# Patient Record
Sex: FEMALE | Race: WHITE | NOT HISPANIC OR LATINO | Employment: FULL TIME | URBAN - METROPOLITAN AREA
[De-identification: names, ages, dates, MRNs, and addresses within clinical notes are randomized per-mention and may not be internally consistent; named-entity substitution may affect disease eponyms.]

---

## 2024-02-11 ENCOUNTER — HOSPITAL ENCOUNTER (INPATIENT)
Facility: MEDICAL CENTER | Age: 29
LOS: 3 days | DRG: 418 | End: 2024-02-14
Attending: HOSPITALIST | Admitting: HOSPITALIST
Payer: COMMERCIAL

## 2024-02-11 DIAGNOSIS — K81.0 ACUTE CHOLECYSTITIS: ICD-10-CM

## 2024-02-11 DIAGNOSIS — Z90.49 S/P LAPAROSCOPIC CHOLECYSTECTOMY: ICD-10-CM
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
## 2024-02-11 PROBLEM — D56.9 THALASSEMIA: Status: ACTIVE | Noted: 2024-02-11

## 2024-02-11 PROBLEM — E80.6 HYPERBILIRUBINEMIA: Status: ACTIVE | Noted: 2024-02-11

## 2024-02-11 PROBLEM — K83.1 OBSTRUCTIVE JAUNDICE: Status: ACTIVE | Noted: 2024-02-11

## 2024-02-11 PROBLEM — E87.6 HYPOKALEMIA: Status: ACTIVE | Noted: 2024-02-11

## 2024-02-11 PROBLEM — K80.50 CHOLEDOCHOLITHIASIS: Status: ACTIVE | Noted: 2024-02-11

## 2024-02-11 PROBLEM — K83.1 OBSTRUCTIVE JAUNDICE: Status: RESOLVED | Noted: 2024-02-11 | Resolved: 2024-02-11

## 2024-02-11 PROCEDURE — 700105 HCHG RX REV CODE 258: Performed by: HOSPITALIST

## 2024-02-11 PROCEDURE — 770001 HCHG ROOM/CARE - MED/SURG/GYN PRIV*

## 2024-02-11 PROCEDURE — 94760 N-INVAS EAR/PLS OXIMETRY 1: CPT

## 2024-02-11 PROCEDURE — 99223 1ST HOSP IP/OBS HIGH 75: CPT | Performed by: HOSPITALIST

## 2024-02-11 PROCEDURE — 700111 HCHG RX REV CODE 636 W/ 250 OVERRIDE (IP): Mod: JZ | Performed by: HOSPITALIST

## 2024-02-11 RX ORDER — PROMETHAZINE HYDROCHLORIDE 25 MG/1
12.5-25 SUPPOSITORY RECTAL EVERY 4 HOURS PRN
Status: DISCONTINUED | OUTPATIENT
Start: 2024-02-11 | End: 2024-02-14 | Stop reason: HOSPADM

## 2024-02-11 RX ORDER — HYDROMORPHONE HYDROCHLORIDE 1 MG/ML
0.5 INJECTION, SOLUTION INTRAMUSCULAR; INTRAVENOUS; SUBCUTANEOUS
Status: DISCONTINUED | OUTPATIENT
Start: 2024-02-11 | End: 2024-02-14

## 2024-02-11 RX ORDER — POLYETHYLENE GLYCOL 3350 17 G/17G
1 POWDER, FOR SOLUTION ORAL
Status: DISCONTINUED | OUTPATIENT
Start: 2024-02-11 | End: 2024-02-14 | Stop reason: HOSPADM

## 2024-02-11 RX ORDER — ONDANSETRON 2 MG/ML
4 INJECTION INTRAMUSCULAR; INTRAVENOUS EVERY 4 HOURS PRN
Status: DISCONTINUED | OUTPATIENT
Start: 2024-02-11 | End: 2024-02-14 | Stop reason: HOSPADM

## 2024-02-11 RX ORDER — PROCHLORPERAZINE EDISYLATE 5 MG/ML
5-10 INJECTION INTRAMUSCULAR; INTRAVENOUS EVERY 4 HOURS PRN
Status: DISCONTINUED | OUTPATIENT
Start: 2024-02-11 | End: 2024-02-14 | Stop reason: HOSPADM

## 2024-02-11 RX ORDER — PROMETHAZINE HYDROCHLORIDE 25 MG/1
12.5-25 TABLET ORAL EVERY 4 HOURS PRN
Status: DISCONTINUED | OUTPATIENT
Start: 2024-02-11 | End: 2024-02-14 | Stop reason: HOSPADM

## 2024-02-11 RX ORDER — ONDANSETRON 4 MG/1
4 TABLET, ORALLY DISINTEGRATING ORAL EVERY 4 HOURS PRN
Status: DISCONTINUED | OUTPATIENT
Start: 2024-02-11 | End: 2024-02-14 | Stop reason: HOSPADM

## 2024-02-11 RX ORDER — ACETAMINOPHEN 325 MG/1
650 TABLET ORAL EVERY 6 HOURS PRN
Status: DISCONTINUED | OUTPATIENT
Start: 2024-02-11 | End: 2024-02-14 | Stop reason: HOSPADM

## 2024-02-11 RX ORDER — AMOXICILLIN 250 MG
2 CAPSULE ORAL 2 TIMES DAILY
Status: DISCONTINUED | OUTPATIENT
Start: 2024-02-11 | End: 2024-02-14 | Stop reason: HOSPADM

## 2024-02-11 RX ADMIN — HYDROMORPHONE HYDROCHLORIDE 0.5 MG: 1 INJECTION, SOLUTION INTRAMUSCULAR; INTRAVENOUS; SUBCUTANEOUS at 22:07

## 2024-02-11 RX ADMIN — PIPERACILLIN SODIUM AND TAZOBACTAM SODIUM 3.38 G: 3; .375 INJECTION, POWDER, LYOPHILIZED, FOR SOLUTION INTRAVENOUS at 22:10

## 2024-02-11 ASSESSMENT — ENCOUNTER SYMPTOMS
BRUISES/BLEEDS EASILY: 0
BLURRED VISION: 0
INSOMNIA: 0
NAUSEA: 1
ABDOMINAL PAIN: 1
COUGH: 0
VOMITING: 0
DIZZINESS: 0
MYALGIAS: 0
SHORTNESS OF BREATH: 0
DOUBLE VISION: 0
DEPRESSION: 0
SORE THROAT: 0
NECK PAIN: 0
HEADACHES: 0
PALPITATIONS: 0
FALLS: 1
WEAKNESS: 0
FEVER: 0

## 2024-02-11 ASSESSMENT — LIFESTYLE VARIABLES
EVER FELT BAD OR GUILTY ABOUT YOUR DRINKING: NO
CONSUMPTION TOTAL: POSITIVE
ALCOHOL_USE: YES
TOTAL SCORE: 0
EVER HAD A DRINK FIRST THING IN THE MORNING TO STEADY YOUR NERVES TO GET RID OF A HANGOVER: NO
ON A TYPICAL DAY WHEN YOU DRINK ALCOHOL HOW MANY DRINKS DO YOU HAVE: 2
HOW MANY TIMES IN THE PAST YEAR HAVE YOU HAD 5 OR MORE DRINKS IN A DAY: 5
TOTAL SCORE: 0
HAVE PEOPLE ANNOYED YOU BY CRITICIZING YOUR DRINKING: NO
TOTAL SCORE: 0
HAVE YOU EVER FELT YOU SHOULD CUT DOWN ON YOUR DRINKING: NO
AVERAGE NUMBER OF DAYS PER WEEK YOU HAVE A DRINK CONTAINING ALCOHOL: 1

## 2024-02-11 ASSESSMENT — PAIN DESCRIPTION - PAIN TYPE
TYPE: ACUTE PAIN

## 2024-02-11 ASSESSMENT — PATIENT HEALTH QUESTIONNAIRE - PHQ9
2. FEELING DOWN, DEPRESSED, IRRITABLE, OR HOPELESS: NOT AT ALL
SUM OF ALL RESPONSES TO PHQ9 QUESTIONS 1 AND 2: 0
1. LITTLE INTEREST OR PLEASURE IN DOING THINGS: NOT AT ALL

## 2024-02-11 ASSESSMENT — FIBROSIS 4 INDEX: FIB4 SCORE: 1.28

## 2024-02-12 ENCOUNTER — APPOINTMENT (OUTPATIENT)
Dept: RADIOLOGY | Facility: MEDICAL CENTER | Age: 29
DRG: 418 | End: 2024-02-12
Attending: INTERNAL MEDICINE
Payer: COMMERCIAL

## 2024-02-12 ENCOUNTER — ANESTHESIA EVENT (OUTPATIENT)
Dept: SURGERY | Facility: MEDICAL CENTER | Age: 29
DRG: 418 | End: 2024-02-12
Payer: COMMERCIAL

## 2024-02-12 PROBLEM — K82.8 THICKENING OF WALL OF GALLBLADDER: Status: ACTIVE | Noted: 2024-02-12

## 2024-02-12 PROBLEM — D56.1 BETA-THALASSEMIA (HCC): Status: ACTIVE | Noted: 2024-02-11

## 2024-02-12 LAB
ALBUMIN SERPL BCP-MCNC: 4.3 G/DL (ref 3.2–4.9)
ALBUMIN/GLOB SERPL: 2.2 G/DL
ALP SERPL-CCNC: 86 U/L (ref 30–99)
ALT SERPL-CCNC: 419 U/L (ref 2–50)
ANION GAP SERPL CALC-SCNC: 14 MMOL/L (ref 7–16)
AST SERPL-CCNC: 367 U/L (ref 12–45)
BILIRUB SERPL-MCNC: 10.8 MG/DL (ref 0.1–1.5)
BUN SERPL-MCNC: 6 MG/DL (ref 8–22)
CALCIUM ALBUM COR SERPL-MCNC: 8.1 MG/DL (ref 8.5–10.5)
CALCIUM SERPL-MCNC: 8.3 MG/DL (ref 8.4–10.2)
CHLORIDE SERPL-SCNC: 105 MMOL/L (ref 96–112)
CO2 SERPL-SCNC: 19 MMOL/L (ref 20–33)
CREAT SERPL-MCNC: 0.37 MG/DL (ref 0.5–1.4)
ERYTHROCYTE [DISTWIDTH] IN BLOOD BY AUTOMATED COUNT: 42.6 FL (ref 35.9–50)
FERRITIN SERPL-MCNC: 1934 NG/ML (ref 10–291)
GFR SERPLBLD CREATININE-BSD FMLA CKD-EPI: 142 ML/MIN/1.73 M 2
GLOBULIN SER CALC-MCNC: 2 G/DL (ref 1.9–3.5)
GLUCOSE SERPL-MCNC: 106 MG/DL (ref 65–99)
HCT VFR BLD AUTO: 33 % (ref 37–47)
HGB BLD-MCNC: 9.8 G/DL (ref 12–16)
IRON SATN MFR SERPL: ABNORMAL % (ref 15–55)
IRON SERPL-MCNC: 147 UG/DL (ref 40–170)
LIPASE SERPL-CCNC: 18 U/L (ref 11–82)
MCH RBC QN AUTO: 20.7 PG (ref 27–33)
MCHC RBC AUTO-ENTMCNC: 29.7 G/DL (ref 32.2–35.5)
MCV RBC AUTO: 69.8 FL (ref 81.4–97.8)
PLATELET # BLD AUTO: 283 K/UL (ref 164–446)
POTASSIUM SERPL-SCNC: 4.2 MMOL/L (ref 3.6–5.5)
PROT SERPL-MCNC: 6.3 G/DL (ref 6–8.2)
RBC # BLD AUTO: 4.73 M/UL (ref 4.2–5.4)
SODIUM SERPL-SCNC: 138 MMOL/L (ref 135–145)
TIBC SERPL-MCNC: ABNORMAL UG/DL (ref 250–450)
UIBC SERPL-MCNC: <17 UG/DL (ref 110–370)
WBC # BLD AUTO: 10.1 K/UL (ref 4.8–10.8)

## 2024-02-12 PROCEDURE — 83550 IRON BINDING TEST: CPT

## 2024-02-12 PROCEDURE — 74181 MRI ABDOMEN W/O CONTRAST: CPT

## 2024-02-12 PROCEDURE — 36415 COLL VENOUS BLD VENIPUNCTURE: CPT

## 2024-02-12 PROCEDURE — 700111 HCHG RX REV CODE 636 W/ 250 OVERRIDE (IP): Mod: JZ | Performed by: HOSPITALIST

## 2024-02-12 PROCEDURE — 770001 HCHG ROOM/CARE - MED/SURG/GYN PRIV*

## 2024-02-12 PROCEDURE — 700105 HCHG RX REV CODE 258: Performed by: HOSPITALIST

## 2024-02-12 PROCEDURE — 85027 COMPLETE CBC AUTOMATED: CPT

## 2024-02-12 PROCEDURE — 99418 PROLNG IP/OBS E/M EA 15 MIN: CPT | Performed by: INTERNAL MEDICINE

## 2024-02-12 PROCEDURE — 80053 COMPREHEN METABOLIC PANEL: CPT

## 2024-02-12 PROCEDURE — 83690 ASSAY OF LIPASE: CPT

## 2024-02-12 PROCEDURE — 94760 N-INVAS EAR/PLS OXIMETRY 1: CPT

## 2024-02-12 PROCEDURE — 99233 SBSQ HOSP IP/OBS HIGH 50: CPT | Performed by: INTERNAL MEDICINE

## 2024-02-12 PROCEDURE — 82728 ASSAY OF FERRITIN: CPT

## 2024-02-12 PROCEDURE — 700111 HCHG RX REV CODE 636 W/ 250 OVERRIDE (IP): Mod: JZ | Performed by: INTERNAL MEDICINE

## 2024-02-12 PROCEDURE — 83540 ASSAY OF IRON: CPT

## 2024-02-12 PROCEDURE — 700105 HCHG RX REV CODE 258: Performed by: INTERNAL MEDICINE

## 2024-02-12 RX ORDER — IBUPROFEN 200 MG
400-600 TABLET ORAL EVERY 6 HOURS PRN
COMMUNITY

## 2024-02-12 RX ORDER — HYDROMORPHONE HYDROCHLORIDE 1 MG/ML
0.5 INJECTION, SOLUTION INTRAMUSCULAR; INTRAVENOUS; SUBCUTANEOUS ONCE
Status: COMPLETED | OUTPATIENT
Start: 2024-02-12 | End: 2024-02-12

## 2024-02-12 RX ORDER — SODIUM CHLORIDE, SODIUM LACTATE, POTASSIUM CHLORIDE, CALCIUM CHLORIDE 600; 310; 30; 20 MG/100ML; MG/100ML; MG/100ML; MG/100ML
INJECTION, SOLUTION INTRAVENOUS CONTINUOUS
Status: ACTIVE | OUTPATIENT
Start: 2024-02-12 | End: 2024-02-14

## 2024-02-12 RX ADMIN — HYDROMORPHONE HYDROCHLORIDE 0.5 MG: 1 INJECTION, SOLUTION INTRAMUSCULAR; INTRAVENOUS; SUBCUTANEOUS at 01:25

## 2024-02-12 RX ADMIN — PIPERACILLIN SODIUM AND TAZOBACTAM SODIUM 3.38 G: 3; .375 INJECTION, POWDER, LYOPHILIZED, FOR SOLUTION INTRAVENOUS at 01:26

## 2024-02-12 RX ADMIN — HYDROMORPHONE HYDROCHLORIDE 0.5 MG: 1 INJECTION, SOLUTION INTRAMUSCULAR; INTRAVENOUS; SUBCUTANEOUS at 03:24

## 2024-02-12 RX ADMIN — SODIUM CHLORIDE, POTASSIUM CHLORIDE, SODIUM LACTATE AND CALCIUM CHLORIDE: 600; 310; 30; 20 INJECTION, SOLUTION INTRAVENOUS at 14:04

## 2024-02-12 RX ADMIN — HYDROMORPHONE HYDROCHLORIDE 0.5 MG: 1 INJECTION, SOLUTION INTRAMUSCULAR; INTRAVENOUS; SUBCUTANEOUS at 05:28

## 2024-02-12 RX ADMIN — CEFTRIAXONE SODIUM 1000 MG: 1 INJECTION, POWDER, FOR SOLUTION INTRAMUSCULAR; INTRAVENOUS at 12:58

## 2024-02-12 ASSESSMENT — ENCOUNTER SYMPTOMS
VOMITING: 0
FEVER: 0
CONSTIPATION: 0
PALPITATIONS: 0
HEADACHES: 0
COUGH: 0
ABDOMINAL PAIN: 0
BACK PAIN: 0
SHORTNESS OF BREATH: 0
DIZZINESS: 0
NAUSEA: 0
CHILLS: 0
DIARRHEA: 0

## 2024-02-12 ASSESSMENT — COGNITIVE AND FUNCTIONAL STATUS - GENERAL
MOVING TO AND FROM BED TO CHAIR: A LITTLE
SUGGESTED CMS G CODE MODIFIER MOBILITY: CJ
SUGGESTED CMS G CODE MODIFIER DAILY ACTIVITY: CH
MOBILITY SCORE: 21
DAILY ACTIVITIY SCORE: 24
TURNING FROM BACK TO SIDE WHILE IN FLAT BAD: A LITTLE
MOVING FROM LYING ON BACK TO SITTING ON SIDE OF FLAT BED: A LITTLE

## 2024-02-12 ASSESSMENT — PAIN DESCRIPTION - PAIN TYPE
TYPE: ACUTE PAIN

## 2024-02-12 NOTE — PROGRESS NOTES
Hospital Medicine Daily Progress Note    Date of Service  2/12/2024    Chief Complaint  Sandra Krishnan is a 28 y.o. female admitted 2/11/2024 with   Transfer from [facility]    Hospital Course  No notes on file    Interval Problem Update  Patient stated she does not want any laparoscopic cholecystectomy if there is no signs of infection.  -Pending MRCP  - General surgery aware, Dr. Phelan evaluated patient, awaiting imaging.  -I reviewed labs , , ALP 86, T. bili 10.8     I spent extra time on patient's case since 16:00.  - I discussed case with Dr. Phelan, we explored options including laparoscopic cholecystectomy, HIDA scan, monitoring.  T. bili is constantly elevated at 10.8 from a patient says.  HIDA scan here at [facility] will not be able to be performed with a high bilirubin.  We have changed recommendations to conservatively manage patient, allow patient to eat tonight, stop antibiotics and see her labs in the morning.  If there is any signs of improvement in her LFTs and no leukocytosis tomorrow, she may have passed the stone without the need for ERCP and she may not need cholecystectomy.  At that point we can abide by patient's wishes to avoid any surgery.    I have discussed this patient's plan of care and discharge plan at IDT rounds today with Case Management, Nursing, Nursing leadership, and other members of the IDT team.    Consultants/Specialty  general surgery    Code Status  Full Code    Disposition  The patient is not medically cleared for discharge to home or a post-acute facility.      I have placed the appropriate orders for post-discharge needs.    Review of Systems  Review of Systems   Constitutional:  Negative for chills, fever and malaise/fatigue.   Respiratory:  Negative for cough and shortness of breath.    Cardiovascular:  Negative for chest pain and palpitations.   Gastrointestinal:  Negative for abdominal pain, constipation, diarrhea, nausea and  vomiting.   Musculoskeletal:  Negative for back pain and joint pain.   Skin:         Jaundice+   Neurological:  Negative for dizziness and headaches.   All other systems reviewed and are negative.       Physical Exam  Temp:  [36.3 °C (97.4 °F)-37.1 °C (98.7 °F)] 36.9 °C (98.5 °F)  Pulse:  [60-75] 73  Resp:  [16-18] 18  BP: (102-125)/(54-73) 111/61  SpO2:  [93 %-98 %] 94 %    Physical Exam  Vitals and nursing note reviewed.   Constitutional:       General: She is not in acute distress.     Appearance: She is not ill-appearing.   HENT:      Head: Normocephalic and atraumatic.   Eyes:      General: No scleral icterus.     Extraocular Movements: Extraocular movements intact.   Cardiovascular:      Rate and Rhythm: Normal rate.      Pulses: Normal pulses.      Heart sounds: Normal heart sounds. No murmur heard.  Pulmonary:      Effort: Pulmonary effort is normal. No respiratory distress.      Breath sounds: Normal breath sounds.   Abdominal:      General: Abdomen is flat. Bowel sounds are normal. There is no distension.      Palpations: Abdomen is soft.   Musculoskeletal:         General: No swelling or tenderness. Normal range of motion.      Cervical back: Normal range of motion and neck supple.   Skin:     General: Skin is warm.      Capillary Refill: Capillary refill takes less than 2 seconds.      Coloration: Skin is jaundiced.      Findings: No erythema.   Neurological:      General: No focal deficit present.      Mental Status: She is alert and oriented to person, place, and time. Mental status is at baseline.      Motor: No weakness.   Psychiatric:         Mood and Affect: Mood normal.         Behavior: Behavior normal.         Thought Content: Thought content normal.         Judgment: Judgment normal.         Fluids    Intake/Output Summary (Last 24 hours) at 2/12/2024 1733  Last data filed at 2/12/2024 1727  Gross per 24 hour   Intake 240 ml   Output 700 ml   Net -460 ml       Laboratory  Recent Labs      02/11/24  1348 02/12/24  0235   WBC 16.8* 10.1   RBC 5.24 4.73   HEMOGLOBIN 10.8* 9.8*   HEMATOCRIT 36.3* 33.0*   MCV 69.3* 69.8*   MCH 20.6* 20.7*   MCHC 29.8* 29.7*   RDW 19.2* 42.6   PLATELETCT 271 283   MPV -  --      Recent Labs     02/11/24  1348 02/12/24  0235   SODIUM 139 138   POTASSIUM 3.4* 4.2   CHLORIDE 109* 105   CO2 19* 19*   GLUCOSE 101* 106*   BUN 9 6*   CREATININE 0.7 0.37*   CALCIUM 9.0 8.3*                   Imaging  [id number]   Final Result      1.  No significant biliary dilation.   2.  No common bile duct stone demonstrated.  Apparent interval passage of previously described stones.   3.  Gallbladder wall thickening suggesting cholecystitis, without gallstone present.   4.  Liver shows periportal edema which may be related to intra-abdominal inflammatory process or aggressive hydration.   5.  Mild splenomegaly.           Assessment/Plan  * Choledocholithiasis- (present on admission)  Assessment & Plan  -Inpatient status on medical floor.  -N.p.o. for now.  -Patient presenting with abdominal pain that is started 30 to 40 minutes after snowboarding fall with CT negative for acute trauma at Outside facility.  -CT showing cholelithiasis and biliary duct dilation and unclear if trauma possibly dislodged a stone. Prior h/o ERCP 4 year ago, and still has Gallbladder. Her total bilirubin is 6.9 but patient has baseline hyperbilirubinemia given underlying history of thalassemia.  -Noticed Splenomegaly on CT.     -Pending MRCP  - I discussed with general surgeon Dr. Phelan, patient does not want cholecystectomy if there is no signs of infection.  - May need GI first   -I reviewed labs , , ALP 86, T. bili 10.8     Thickening of wall of gallbladder- (present on admission)  Assessment & Plan  Seen on MRCP  Unable to do HIDA scan due to chronic hyperbilirubinemia    I spent extra time at patient's bedside total 15 minutes  -Prior to me arriving, I was discussing case with Dr. Phelan,  informing about MRCP results and potential treatment plan including laparoscopic cholecystectomy.  I increased patient's ceftriaxone dose to cover for potential acute cholecystitis seen on MRCP.  There was no choledocholithiasis seen on scan.  -I discussed with patient about her MRCP results.  Patient describing she was sent here only for ERCP and demanding to know why she is now needing surgery.  I explained to her that the MRCP does not show choledocholithiasis which does not warrant an ERCP.  I explained that the surgeon will speak with her today but will need to see her in between his operating times.  She was frustrated at the delay in care.  I explained that the surgeon would be the best person to hopefully explain any issues with the laparoscopic cholecystectomy and her thalassemia condition.   -She spoke with Dr. Phelan, she decided not to do surgery.  Shortly after speaking with Dr. Phelan she changed her mind and told bedside RN Liz she wanted to proceed with surgery.  At this time I was already speaking with Dr. Phelan who recommended HIDA scan given the patient's concerns and uncertainty of her decision.  - I went back to patient's room and explained that we need to do a HIDA scan as per surgery's recommendation.  Patient and her fiancé question why we need to do a HIDA scan now.  I explained that this will help us further investigate if she has cholecystitis but she will need to eat diet tonight and perform the scan tomorrow.  I explained that this will delay her potential discharge, she was requesting to discharge in the next day or so.  -I read discussed with Dr. Phelan, T. bili is constantly elevated at 10.8 from a patient says.  HIDA scan here at [facility] will not be able to be performed with a high bilirubin.  We have changed recommendations to conservatively manage patient, allow patient to eat tonight, stop antibiotics and see her labs in the morning.  If there is any signs of improvement,  she may have passed the stone without the need for ERCP.  At that point we can abide by patient's wishes to avoid any surgery.    Fall- (present on admission)  Assessment & Plan  -Fell forward after jumping with snowboard. Developed RUQ pain 30-40 min after. No signs of trauma in her abdomen and CT Abdomen done at outside facility was negative for acute traumatic injury.   -Hemodynamically stable. Close monitor. Plan as above.      Hyperbilirubinemia- (present on admission)  Assessment & Plan  -Total bilirubin level 6.9. Reports baseline elevated Bilirubin with h/o Thalassemia  -T. bili 10.8, worse.    Hypokalemia- (present on admission)  Assessment & Plan  -Potassium 3.4 at outside facility.   Potassium 4.2, monitor    Beta-thalassemia (HCC)- (present on admission)  Assessment & Plan  -Initial hemoglobin is 10.8.  Patient states this is her baseline with underlying history of thalassemia.  -She is hemodynamically stable with normal vital signs on admission.    -Hemoglobin 9.8, MCV 69.8  - Mentzer Index 14.8 indicating TIMOTHY (over 13), I ordered Ferritin and Iron Panel         VTE prophylaxis:   SCDs/TEDs      I have performed a physical exam and reviewed and updated ROS and Plan today (2/12/2024). In review of yesterday's note (2/11/2024), there are no changes except as documented above.      Total time spent 51 minutes. I spent greater than 50% of the time for patient care, including unit/floor time, multiple face-to-face encounters with the patient, counseling on treatment plan and discussion with bedside RN.  Further, I spent time on my own review of patient's overnight events, RN notes, imaging and lab analysis, and developing my assessment and plan above.  In addition, working with , social workers, and Charge RN on case coordination on this date.

## 2024-02-12 NOTE — ASSESSMENT & PLAN NOTE
-Initial hemoglobin is 10.8.  Patient states this is her baseline with underlying history of thalassemia.  -She is hemodynamically stable with normal vital signs on admission.    -Hemoglobin 9.8, MCV 69.8  - Mentzer Index 14.8 indicating TIMOTHY (over 13),   Ferritin 1900+, iron levels unable to be detected.  Patient to follow-up with her hematologist at [facility] in [facility]  I reviewed patient's out side [facility] records, she has beta thalassemia trait diagnosed by Dr. Richard.  She does not have alpha thalassemia on PCR.  In 2020 she had an ERCP for stone extraction and had been receiving once a month acupuncture since then as a prophylaxis for cholecystitis.

## 2024-02-12 NOTE — CONSULTS
"      General Surgery Consult    CHIEF COMPLAINT: Abdominal pain    REFERRING PROVIDER: Dr. Dilcia Olmedo MD    HISTORY OF PRESENT ILLNESS: The patient is a 28 y.o. female, who presents with abdominal pain following a snowboarding fall.  She has history of thalassemia, cholelithiasis and choledocholithiasis with prior ERCP.  She states that she does not want undergo cholecystectomy.  She is willing to undergo ERCP    PAST MEDICAL HISTORY:  has a past medical history of Gallstones.  Thalassemia, choledocholithiasis    PAST SURGICAL HISTORY: patient denies any surgical history     ALLERGIES: No Known Allergies     CURRENT MEDICATIONS:   Home Medications       Reviewed by Huma Tom (Pharmacy Tech) on 02/12/24 at 0735  Med List Status: Complete     Medication Last Dose Status   ibuprofen (MOTRIN) 200 MG Tab 2/10/2024 Active                    FAMILY HISTORY: History reviewed. No pertinent family history.     SOCIAL HISTORY:   Social History     Tobacco Use    Smoking status: Never    Smokeless tobacco: Never   Vaping Use    Vaping Use: Never used   Substance and Sexual Activity    Alcohol use: Never    Drug use: Never    Sexual activity: Not on file       REVIEW OF SYSTEMS: Comprehensive review of systems was negative aside from abdominal pain    PHYSICAL EXAMINATION:       VITAL SIGNS: /69   Pulse 69   Temp 36.6 °C (97.8 °F) (Temporal)   Resp 18   Ht 1.575 m (5' 2\")   Wt 57.2 kg (126 lb 1.7 oz)   SpO2 95%   GENERAL: The patient is awake and alert, appears well  HEENT:  MMM. Neck supple. No adenopathy.  PULM:No respiratory distress.  CARDIOVASCULAR: Regular rate. The extremities are well perfused.   ABDOMEN: Soft, nontender  EXTREMITIES: Examination of the upper and lower extremities demonstrates no cyanosis edema or clubbing.  NEUROLOGIC: Alert & oriented x 3, Normal motor function, Normal sensory function, No focal deficits noted.    LABORATORY VALUES:   Recent Labs     " 02/11/24  1348 02/12/24  0235   WBC 16.8* 10.1   RBC 5.24 4.73   HEMOGLOBIN 10.8* 9.8*   HEMATOCRIT 36.3* 33.0*   MCV 69.3* 69.8*   MCH 20.6* 20.7*   MCHC 29.8* 29.7*   RDW 19.2* 42.6   PLATELETCT 271 283   MPV -  --      Recent Labs     02/11/24  1348 02/12/24  0235   SODIUM 139 138   POTASSIUM 3.4* 4.2   CHLORIDE 109* 105   CO2 19* 19*   GLUCOSE 101* 106*   BUN 9 6*   CREATININE 0.7 0.37*   CALCIUM 9.0 8.3*     Recent Labs     02/11/24  1348 02/12/24  0235   ASTSGOT 94* 367*   ALTSGPT 50 419*   TBILIRUBIN 6.9* 10.8*   DBILIRUBIN 0.9*  --    ALKPHOSPHAT 65 86   GLOBULIN  --  2.0            IMAGING:   [id number]    (Results Pending)       IMPRESSION AND PLAN:   28-year-old female with a history of thalassemia and choledocholithiasis now with evidence of biliary obstruction by labs  1.  Appreciate medical care  2.  MRCP  3.  GI consult for ERCP if positive  4.  HIDA  5.  Empiric antibiotics  6.  DVT prophylaxis  7.  N.p.o./IV fluids  8.  General surgery will follow          Rayshawn Phelan MD    ___________________________________   Rayshawn Phelan M.D.    DD: 2/12/2024 DT: 8:12 AM

## 2024-02-12 NOTE — PROGRESS NOTES
[facility] DIRECT ADMISSION REPORT    Transferring facility: [facility]      Transferring physician: Stu Smith D.O.      Chief complaint: Obstructive jaundice    Pertinent history & patient course: 28 years old female with a past medical history of cholelithiasis, choledocholithiasis and preserved gallbladder who presented with right upper quadrant abdominal pain.  She does have leukocytosis of 16.8 elevated liver enzymes and bilirubin of 6.9.  CT imaging is concerning for dilated common bile duct with obstructing stones.  The patient started empirically for potential cholangitis and she is being transferred to our facility for the need for ERCP..    Code Status: FULL per transferring provider, I personally verified with the transferring provider patient's code status and the transferring provider has confirmed this with the patient.    Reason for Transfer: Patient has choledocholithiasis & needs ERCP    Patient accepted for transfer: Yes    Accepting [facility] Facility: [facility] - Nursing to notify the admitting provider when patient arrives to the unit.    Consultants to be called upon arrival: Will need GI consultation    Admission status: Inpatient.     Floor requested: [facility]    The admitting provider is the point of contact for questions or concerns regarding patient's care.

## 2024-02-12 NOTE — CARE PLAN
The patient is Stable - Low risk of patient condition declining or worsening    Shift Goals  Clinical Goals: Patient will report pain improvement by 1 point after interventions, safe from falls this shift  Patient Goals: Pain mangement    Progress made toward(s) clinical / shift goals:  Patient reports pain improvement after interventions, able to ambulate and rest. Patient safe from falls, safety measures in place. NPO strict this shift.     Patient is not progressing towards the following goals: N/A

## 2024-02-12 NOTE — PROGRESS NOTES
Bedside report received from BOSTON Tobias. Assumed care of patient. Daily plan of care discussed. Pt resting comfortably in bed with no signs of distress noted. Breathing even and unlabored. Patient waiting on MRCP . Patient reports pain level 0/10.  Patient reports no further needs at this time. Call light within reach. Bed locked in lowest position. Plan of care on going.

## 2024-02-12 NOTE — ASSESSMENT & PLAN NOTE
-Total bilirubin level 6.9. Reports baseline elevated Bilirubin with h/o Thalassemia  - total bili 7.2, direct bili 1.3, indirect bili 5.9.  At this time she has mainly indirect bilirubinemia due to thalassemia.  She had passed gallstones

## 2024-02-12 NOTE — PROGRESS NOTES
1940 - Received patient report from Pricila [facility] RN, via phone.     2120 - Patient is awake, alert & oriented x4, reports abdominal pain, denies nausea, denies shortness of breath. Patient on room air, respirations unlabored. Patient resting in bed. Significant other at bedside. Patient educated on call light use for needs. Plan of care discussed with patient. Belongings within reach. Bed at lowest position. Safety precautions in place.

## 2024-02-12 NOTE — PROGRESS NOTES
4 Eyes Skin Assessment Completed by BOSTON Tobias and BOSTON Calderon.    Head WDL  Ears WDL  Nose WDL  Mouth WDL  Neck WDL  Breast/Chest WDL  Shoulder Blades WDL  Spine WDL  (R) Arm/Elbow/Hand WDL  (L) Arm/Elbow/Hand WDL  Abdomen WDL  Groin WDL  Scrotum/Coccyx/Buttocks WDL  (R) Leg WDL  (L) Leg WDL  (R) Heel/Foot/Toe WDL  (L) Heel/Foot/Toe WDL          Devices In Places Blood Pressure Cuff and Pulse Ox      Interventions In Place Pillows    Possible Skin Injury No    Pictures Uploaded Into Epic N/A  Wound Consult Placed N/A  RN Wound Prevention Protocol Ordered No

## 2024-02-12 NOTE — ASSESSMENT & PLAN NOTE
I reviewed CT scan at bedside with patient from [facility], I showed her the stones that were found on CT scan in the common bile duct.  I showed her her MRI which was negative for any further choledocholithiasis.  She did present with choledocholithiasis which may have moved or passed during her ambulance trip to [facility].

## 2024-02-12 NOTE — ASSESSMENT & PLAN NOTE
-Fell forward after jumping with snowboard. Developed RUQ pain 30-40 min after. No signs of trauma in her abdomen and CT Abdomen done at outside facility was negative for acute traumatic injury.   -Hemodynamically stable. Close monitor. Plan as above.

## 2024-02-12 NOTE — H&P
Hospital Medicine History & Physical Note    Date of Service  2/11/2024    Primary Care Physician  Pcp Pt States None    Consultants  General Surgery: I discussed this case with Dr. Hayes     Code Status  Full Code    Chief Complaint  Direct Admission from [facility] for management of Choledocholithiasis and Common Bile Duct Dilation.     History of Presenting Illness  Sandra Krishnan is a 28 y.o. female, with history of thalassemia B (no prior blood transfusions), Splenomegaly and prior choledocholithiasis requiring ERCP (about 4 years ago), who is visiting from [facility]. She was snowboarding earlier in the day and fell forward after a small jump with her snowboard.  There was no head injury and was feeling fine after this, quickly going back to snowboarding however approximately 30 to 40 minutes after this event, developed acute right upper quadrant pain with radiation to her ribs and shoulder.  She denies having shortness of breath, no nausea, vomiting, fever or chills.  She reports feeling well prior to fall.      Patient was evaluated at outside facility ER.  She had stable vital signs.  CT imaging showed no acute intra-abdominal or intrapelvic injury and was significant for choledocholithiasis causing biliary ductal dilation.  She also has leukocytosis of 16.8 and elevated total bilirubin of 6.9.  Patient was a started on antibiotic therapy before empiric treatment of cholangitis and arrangements were done for her to come as a direct admission on 2/11/2024.     I discussed the plan of care with patient and Accepting Hospitalist Dr. Rosado .    Review of Systems  Review of Systems   Constitutional:  Positive for malaise/fatigue. Negative for fever.   HENT:  Negative for congestion and sore throat.    Eyes:  Negative for blurred vision and double vision.   Respiratory:  Negative for cough and shortness of breath.    Cardiovascular:  Negative for chest pain and palpitations.    Gastrointestinal:  Positive for abdominal pain and nausea. Negative for vomiting.   Genitourinary:  Negative for dysuria and urgency.   Musculoskeletal:  Positive for falls. Negative for myalgias and neck pain.   Skin:  Negative for itching and rash.   Neurological:  Negative for dizziness, weakness and headaches.   Endo/Heme/Allergies:  Does not bruise/bleed easily.   Psychiatric/Behavioral:  Negative for depression. The patient does not have insomnia.        Past Medical History   has a past medical history of Gallstones.  Thalassemia B    Surgical History  ERCP 4 years ago    Family History  Thalassemia  Family history reviewed with patient. There is no family history that is pertinent to the chief complaint.     Social History   reports that she has never smoked. She has never used smokeless tobacco. She reports that she does not drink alcohol and does not use drugs.    Allergies  No Known Allergies    Medications  None       Physical Exam  Temp:  [36.2 °C (97.2 °F)-36.3 °C (97.4 °F)] 36.3 °C (97.4 °F)  Pulse:  [62-86] 64  Resp:  [18] 18  BP: ()/(55-85) 112/73  SpO2:  [92 %-99 %] 98 %  Blood Pressure: 112/73   Temperature: 36.3 °C (97.4 °F)   Pulse: 64   Respiration: 18   Pulse Oximetry: 98 %       Physical Exam  Constitutional:       Appearance: Normal appearance.   HENT:      Head: Normocephalic and atraumatic.      Mouth/Throat:      Mouth: Mucous membranes are moist.      Pharynx: Oropharynx is clear.   Eyes:      Extraocular Movements: Extraocular movements intact.      Pupils: Pupils are equal, round, and reactive to light.   Cardiovascular:      Rate and Rhythm: Normal rate and regular rhythm.      Heart sounds: Normal heart sounds.   Pulmonary:      Effort: Pulmonary effort is normal.      Breath sounds: Normal breath sounds.   Abdominal:      General: Abdomen is flat. Bowel sounds are normal. There is no distension.      Palpations: Abdomen is soft.      Tenderness: There is abdominal tenderness  (Mild pain to RUQ on palpation). There is no guarding or rebound.   Musculoskeletal:      Cervical back: Normal range of motion and neck supple.   Skin:     General: Skin is warm and dry.   Neurological:      General: No focal deficit present.      Mental Status: She is alert and oriented to person, place, and time.   Psychiatric:         Mood and Affect: Mood normal.         Behavior: Behavior normal.         Laboratory:  Recent Labs     02/11/24  1348   WBC 16.8*   RBC 5.24   HEMOGLOBIN 10.8*   HEMATOCRIT 36.3*   MCV 69.3*   MCH 20.6*   MCHC 29.8*   RDW 19.2*   PLATELETCT 271   MPV -     Recent Labs     02/11/24  1348   SODIUM 139   POTASSIUM 3.4*   CHLORIDE 109*   CO2 19*   GLUCOSE 101*   BUN 9   CREATININE 0.7   CALCIUM 9.0     Recent Labs     02/11/24  1348   ALTSGPT 50   ASTSGOT 94*   ALKPHOSPHAT 65   TBILIRUBIN 6.9*   DBILIRUBIN 0.9*   LIPASE 112   GLUCOSE 101*           Imaging:  I reviewed imaging and reports from outside facility. Also discussed imaging finding with Dr. Hayes.      CT of the abdomen and pelvis with IV contrast showed no acute intra-abdominal or intrapelvic injury.  Also showing cholelithiasis causing biliary duct dilation.  There was a right upper quadrant ultrasound that was also done but report is not available.        I extensively reviewed records from outside facility including imaging, laboratory work and notes as described on HPI and above.    Assessment/Plan:  Justification for Admission Status  I anticipate this patient will require at least two midnights for appropriate medical management, necessitating inpatient admission because 29 yo F, who is coming as a Direct Admission from [facility] for management of Choledocholithiasis and Common Bile Duct Dilation .       * Choledocholithiasis  Assessment & Plan  -Inpatient status on medical floor.  -N.p.o. for now.  -Patient presenting with abdominal pain that is started 30 to 40 minutes after snowboarding fall with CT  negative for acute trauma at Outside facility.  -CT showing cholelithiasis and biliary duct dilation and unclear if trauma possibly dislodged a stone. Prior h/o ERCP 4 year ago, and still has Gallbladder. Her total bilirubin is 6.9 but patient has baseline hyperbilirubinemia given underlying history of thalassemia.  -Noticed Splenomegaly on CT.   -She is hemodynamically stable at this time.  -Pain control as needed and added MRCP  -I discussed this case with Dr. Hayes. I appreciate consult and recommendations. Fall likely unrelated to presentation. Likely will need Cholecystectomy.  -She has leukocytosis of 16.8 but no other SIRS. She is not septic on admission, but will give IV antibiotics to cover for cholecystitis.    Fall  Assessment & Plan  -Fell forward after jumping with snowboard. Developed RUQ pain 30-40 min after. No signs of trauma in her abdomen and CT Abdomen done at outside facility was negative for acute traumatic injury.   -Hemodynamically stable. Close monitor. Plan as above.      Hyperbilirubinemia  Assessment & Plan  -Total bilirubin level 6.9. Reports baseline elevated Bilirubin with h/o Thalassemia, but does not knows how much. MRCP and follow up am labs. LFTs minimally abnormal.       Hypokalemia  Assessment & Plan  -Potassium 3.4 at outside facility.   -Repeat Chemistry panel now and replace PRN    Thalassemia  Assessment & Plan  -Initial hemoglobin is 10.8.  Patient states this is her baseline with underlying history of thalassemia.  -She is hemodynamically stable with normal vital signs on admission.  Will closely monitor, repeat CBC in the morning.        VTE prophylaxis: SCDs/TEDs

## 2024-02-13 ENCOUNTER — ANESTHESIA (OUTPATIENT)
Dept: SURGERY | Facility: MEDICAL CENTER | Age: 29
DRG: 418 | End: 2024-02-13
Payer: COMMERCIAL

## 2024-02-13 LAB
ALBUMIN SERPL BCP-MCNC: 3.8 G/DL (ref 3.2–4.9)
ALP SERPL-CCNC: 109 U/L (ref 30–99)
ALT SERPL-CCNC: 392 U/L (ref 2–50)
AST SERPL-CCNC: 190 U/L (ref 12–45)
BILIRUB CONJ SERPL-MCNC: 1.3 MG/DL (ref 0.1–0.5)
BILIRUB INDIRECT SERPL-MCNC: 5.9 MG/DL (ref 0–1)
BILIRUB SERPL-MCNC: 7.2 MG/DL (ref 0.1–1.5)
BUN SERPL-MCNC: 5 MG/DL (ref 8–22)
CALCIUM ALBUM COR SERPL-MCNC: 8.4 MG/DL (ref 8.5–10.5)
CALCIUM SERPL-MCNC: 8.2 MG/DL (ref 8.4–10.2)
CHLORIDE SERPL-SCNC: 107 MMOL/L (ref 96–112)
CO2 SERPL-SCNC: 25 MMOL/L (ref 20–33)
CREAT SERPL-MCNC: 0.45 MG/DL (ref 0.5–1.4)
ERYTHROCYTE [DISTWIDTH] IN BLOOD BY AUTOMATED COUNT: 44.2 FL (ref 35.9–50)
GFR SERPLBLD CREATININE-BSD FMLA CKD-EPI: 135 ML/MIN/1.73 M 2
GLUCOSE SERPL-MCNC: 92 MG/DL (ref 65–99)
HCT VFR BLD AUTO: 31.1 % (ref 37–47)
HGB BLD-MCNC: 9.2 G/DL (ref 12–16)
MAGNESIUM SERPL-MCNC: 2 MG/DL (ref 1.5–2.5)
MCH RBC QN AUTO: 20.9 PG (ref 27–33)
MCHC RBC AUTO-ENTMCNC: 29.6 G/DL (ref 32.2–35.5)
MCV RBC AUTO: 70.7 FL (ref 81.4–97.8)
PATHOLOGY CONSULT NOTE: NORMAL
PHOSPHATE SERPL-MCNC: 3.2 MG/DL (ref 2.5–4.5)
PLATELET # BLD AUTO: 264 K/UL (ref 164–446)
POTASSIUM SERPL-SCNC: 3.8 MMOL/L (ref 3.6–5.5)
PROT SERPL-MCNC: 5.7 G/DL (ref 6–8.2)
RBC # BLD AUTO: 4.4 M/UL (ref 4.2–5.4)
SODIUM SERPL-SCNC: 139 MMOL/L (ref 135–145)
WBC # BLD AUTO: 6.3 K/UL (ref 4.8–10.8)

## 2024-02-13 PROCEDURE — 83735 ASSAY OF MAGNESIUM: CPT

## 2024-02-13 PROCEDURE — 700102 HCHG RX REV CODE 250 W/ 637 OVERRIDE(OP): Performed by: ANESTHESIOLOGY

## 2024-02-13 PROCEDURE — 700111 HCHG RX REV CODE 636 W/ 250 OVERRIDE (IP): Mod: JZ | Performed by: INTERNAL MEDICINE

## 2024-02-13 PROCEDURE — 84155 ASSAY OF PROTEIN SERUM: CPT

## 2024-02-13 PROCEDURE — 0FT44ZZ RESECTION OF GALLBLADDER, PERCUTANEOUS ENDOSCOPIC APPROACH: ICD-10-PCS | Performed by: SURGERY

## 2024-02-13 PROCEDURE — 160031 HCHG SURGERY MINUTES - 1ST 30 MINS LEVEL 5: Performed by: SURGERY

## 2024-02-13 PROCEDURE — 94760 N-INVAS EAR/PLS OXIMETRY 1: CPT

## 2024-02-13 PROCEDURE — 160042 HCHG SURGERY MINUTES - EA ADDL 1 MIN LEVEL 5: Performed by: SURGERY

## 2024-02-13 PROCEDURE — 36415 COLL VENOUS BLD VENIPUNCTURE: CPT

## 2024-02-13 PROCEDURE — 84460 ALANINE AMINO (ALT) (SGPT): CPT

## 2024-02-13 PROCEDURE — 770001 HCHG ROOM/CARE - MED/SURG/GYN PRIV*

## 2024-02-13 PROCEDURE — 82247 BILIRUBIN TOTAL: CPT

## 2024-02-13 PROCEDURE — BF50200 OTHER IMAGING OF BILE DUCTS USING FLUORESCING AGENT, INDOCYANINE GREEN DYE, INTRAOPERATIVE: ICD-10-PCS | Performed by: SURGERY

## 2024-02-13 PROCEDURE — 160009 HCHG ANES TIME/MIN: Performed by: SURGERY

## 2024-02-13 PROCEDURE — 160048 HCHG OR STATISTICAL LEVEL 1-5: Performed by: SURGERY

## 2024-02-13 PROCEDURE — 80069 RENAL FUNCTION PANEL: CPT

## 2024-02-13 PROCEDURE — 160035 HCHG PACU - 1ST 60 MINS PHASE I: Performed by: SURGERY

## 2024-02-13 PROCEDURE — 8E0W4CZ ROBOTIC ASSISTED PROCEDURE OF TRUNK REGION, PERCUTANEOUS ENDOSCOPIC APPROACH: ICD-10-PCS | Performed by: SURGERY

## 2024-02-13 PROCEDURE — 700105 HCHG RX REV CODE 258: Performed by: SURGERY

## 2024-02-13 PROCEDURE — 84075 ASSAY ALKALINE PHOSPHATASE: CPT

## 2024-02-13 PROCEDURE — 160002 HCHG RECOVERY MINUTES (STAT): Performed by: SURGERY

## 2024-02-13 PROCEDURE — 700111 HCHG RX REV CODE 636 W/ 250 OVERRIDE (IP): Mod: JZ | Performed by: ANESTHESIOLOGY

## 2024-02-13 PROCEDURE — 85027 COMPLETE CBC AUTOMATED: CPT

## 2024-02-13 PROCEDURE — 700101 HCHG RX REV CODE 250: Performed by: SURGERY

## 2024-02-13 PROCEDURE — 88304 TISSUE EXAM BY PATHOLOGIST: CPT

## 2024-02-13 PROCEDURE — 84450 TRANSFERASE (AST) (SGOT): CPT

## 2024-02-13 PROCEDURE — A9270 NON-COVERED ITEM OR SERVICE: HCPCS | Performed by: ANESTHESIOLOGY

## 2024-02-13 PROCEDURE — 700104 HCHG RX REV CODE 254: Performed by: SURGERY

## 2024-02-13 PROCEDURE — 82248 BILIRUBIN DIRECT: CPT

## 2024-02-13 PROCEDURE — 700111 HCHG RX REV CODE 636 W/ 250 OVERRIDE (IP): Performed by: ANESTHESIOLOGY

## 2024-02-13 PROCEDURE — 700105 HCHG RX REV CODE 258: Performed by: INTERNAL MEDICINE

## 2024-02-13 PROCEDURE — 700101 HCHG RX REV CODE 250: Performed by: ANESTHESIOLOGY

## 2024-02-13 PROCEDURE — 700111 HCHG RX REV CODE 636 W/ 250 OVERRIDE (IP): Mod: JZ | Performed by: HOSPITALIST

## 2024-02-13 PROCEDURE — 99233 SBSQ HOSP IP/OBS HIGH 50: CPT | Performed by: INTERNAL MEDICINE

## 2024-02-13 PROCEDURE — 502714 HCHG ROBOTIC SURGERY SERVICES: Performed by: SURGERY

## 2024-02-13 RX ORDER — DIPHENHYDRAMINE HYDROCHLORIDE 50 MG/ML
12.5 INJECTION INTRAMUSCULAR; INTRAVENOUS
Status: DISCONTINUED | OUTPATIENT
Start: 2024-02-13 | End: 2024-02-13 | Stop reason: HOSPADM

## 2024-02-13 RX ORDER — HYDROMORPHONE HYDROCHLORIDE 1 MG/ML
0.4 INJECTION, SOLUTION INTRAMUSCULAR; INTRAVENOUS; SUBCUTANEOUS
Status: DISCONTINUED | OUTPATIENT
Start: 2024-02-13 | End: 2024-02-13 | Stop reason: HOSPADM

## 2024-02-13 RX ORDER — HALOPERIDOL 5 MG/ML
1 INJECTION INTRAMUSCULAR
Status: DISCONTINUED | OUTPATIENT
Start: 2024-02-13 | End: 2024-02-13 | Stop reason: HOSPADM

## 2024-02-13 RX ORDER — HYDROMORPHONE HYDROCHLORIDE 1 MG/ML
0.2 INJECTION, SOLUTION INTRAMUSCULAR; INTRAVENOUS; SUBCUTANEOUS
Status: DISCONTINUED | OUTPATIENT
Start: 2024-02-13 | End: 2024-02-13 | Stop reason: HOSPADM

## 2024-02-13 RX ORDER — SODIUM CHLORIDE, SODIUM LACTATE, POTASSIUM CHLORIDE, CALCIUM CHLORIDE 600; 310; 30; 20 MG/100ML; MG/100ML; MG/100ML; MG/100ML
INJECTION, SOLUTION INTRAVENOUS CONTINUOUS
Status: DISCONTINUED | OUTPATIENT
Start: 2024-02-13 | End: 2024-02-13

## 2024-02-13 RX ORDER — OXYCODONE HCL 5 MG/5 ML
10 SOLUTION, ORAL ORAL
Status: COMPLETED | OUTPATIENT
Start: 2024-02-13 | End: 2024-02-13

## 2024-02-13 RX ORDER — ONDANSETRON 2 MG/ML
4 INJECTION INTRAMUSCULAR; INTRAVENOUS
Status: DISCONTINUED | OUTPATIENT
Start: 2024-02-13 | End: 2024-02-13 | Stop reason: HOSPADM

## 2024-02-13 RX ORDER — ROCURONIUM BROMIDE 10 MG/ML
INJECTION, SOLUTION INTRAVENOUS PRN
Status: DISCONTINUED | OUTPATIENT
Start: 2024-02-13 | End: 2024-02-13 | Stop reason: SURG

## 2024-02-13 RX ORDER — MIDAZOLAM HYDROCHLORIDE 1 MG/ML
INJECTION INTRAMUSCULAR; INTRAVENOUS PRN
Status: DISCONTINUED | OUTPATIENT
Start: 2024-02-13 | End: 2024-02-13 | Stop reason: SURG

## 2024-02-13 RX ORDER — HYDROMORPHONE HYDROCHLORIDE 1 MG/ML
0.1 INJECTION, SOLUTION INTRAMUSCULAR; INTRAVENOUS; SUBCUTANEOUS
Status: DISCONTINUED | OUTPATIENT
Start: 2024-02-13 | End: 2024-02-13 | Stop reason: HOSPADM

## 2024-02-13 RX ORDER — INDOCYANINE GREEN AND WATER 25 MG
2.5 KIT INJECTION ONCE
Status: COMPLETED | OUTPATIENT
Start: 2024-02-13 | End: 2024-02-13

## 2024-02-13 RX ORDER — OXYCODONE HCL 5 MG/5 ML
5 SOLUTION, ORAL ORAL
Status: COMPLETED | OUTPATIENT
Start: 2024-02-13 | End: 2024-02-13

## 2024-02-13 RX ORDER — MEPERIDINE HYDROCHLORIDE 25 MG/ML
6.25 INJECTION INTRAMUSCULAR; INTRAVENOUS; SUBCUTANEOUS
Status: DISCONTINUED | OUTPATIENT
Start: 2024-02-13 | End: 2024-02-13 | Stop reason: HOSPADM

## 2024-02-13 RX ORDER — CEFAZOLIN SODIUM 1 G/3ML
INJECTION, POWDER, FOR SOLUTION INTRAMUSCULAR; INTRAVENOUS PRN
Status: DISCONTINUED | OUTPATIENT
Start: 2024-02-13 | End: 2024-02-13 | Stop reason: SURG

## 2024-02-13 RX ORDER — LIDOCAINE HYDROCHLORIDE 20 MG/ML
INJECTION, SOLUTION EPIDURAL; INFILTRATION; INTRACAUDAL; PERINEURAL PRN
Status: DISCONTINUED | OUTPATIENT
Start: 2024-02-13 | End: 2024-02-13 | Stop reason: SURG

## 2024-02-13 RX ORDER — SODIUM CHLORIDE, SODIUM LACTATE, POTASSIUM CHLORIDE, CALCIUM CHLORIDE 600; 310; 30; 20 MG/100ML; MG/100ML; MG/100ML; MG/100ML
INJECTION, SOLUTION INTRAVENOUS CONTINUOUS
Status: DISCONTINUED | OUTPATIENT
Start: 2024-02-13 | End: 2024-02-13 | Stop reason: HOSPADM

## 2024-02-13 RX ORDER — KETOROLAC TROMETHAMINE 30 MG/ML
INJECTION, SOLUTION INTRAMUSCULAR; INTRAVENOUS PRN
Status: DISCONTINUED | OUTPATIENT
Start: 2024-02-13 | End: 2024-02-13 | Stop reason: SURG

## 2024-02-13 RX ORDER — ONDANSETRON 2 MG/ML
INJECTION INTRAMUSCULAR; INTRAVENOUS PRN
Status: DISCONTINUED | OUTPATIENT
Start: 2024-02-13 | End: 2024-02-13 | Stop reason: SURG

## 2024-02-13 RX ORDER — DEXAMETHASONE SODIUM PHOSPHATE 4 MG/ML
INJECTION, SOLUTION INTRA-ARTICULAR; INTRALESIONAL; INTRAMUSCULAR; INTRAVENOUS; SOFT TISSUE PRN
Status: DISCONTINUED | OUTPATIENT
Start: 2024-02-13 | End: 2024-02-13 | Stop reason: SURG

## 2024-02-13 RX ORDER — BUPIVACAINE HYDROCHLORIDE AND EPINEPHRINE 5; 5 MG/ML; UG/ML
INJECTION, SOLUTION EPIDURAL; INTRACAUDAL; PERINEURAL
Status: DISCONTINUED | OUTPATIENT
Start: 2024-02-13 | End: 2024-02-13 | Stop reason: HOSPADM

## 2024-02-13 RX ADMIN — SODIUM CHLORIDE, POTASSIUM CHLORIDE, SODIUM LACTATE AND CALCIUM CHLORIDE: 600; 310; 30; 20 INJECTION, SOLUTION INTRAVENOUS at 00:02

## 2024-02-13 RX ADMIN — KETOROLAC TROMETHAMINE 30 MG: 30 INJECTION, SOLUTION INTRAMUSCULAR; INTRAVENOUS at 15:14

## 2024-02-13 RX ADMIN — HYDROMORPHONE HYDROCHLORIDE 0.5 MG: 1 INJECTION, SOLUTION INTRAMUSCULAR; INTRAVENOUS; SUBCUTANEOUS at 17:30

## 2024-02-13 RX ADMIN — LIDOCAINE HYDROCHLORIDE 60 MG: 20 INJECTION, SOLUTION EPIDURAL; INFILTRATION; INTRACAUDAL at 14:39

## 2024-02-13 RX ADMIN — CEFAZOLIN 2 G: 1 INJECTION, POWDER, FOR SOLUTION INTRAMUSCULAR; INTRAVENOUS at 14:44

## 2024-02-13 RX ADMIN — MIDAZOLAM HYDROCHLORIDE 1 MG: 1 INJECTION, SOLUTION INTRAMUSCULAR; INTRAVENOUS at 14:39

## 2024-02-13 RX ADMIN — ONDANSETRON 4 MG: 2 INJECTION INTRAMUSCULAR; INTRAVENOUS at 14:48

## 2024-02-13 RX ADMIN — DEXAMETHASONE SODIUM PHOSPHATE 8 MG: 4 INJECTION INTRA-ARTICULAR; INTRALESIONAL; INTRAMUSCULAR; INTRAVENOUS; SOFT TISSUE at 14:48

## 2024-02-13 RX ADMIN — FENTANYL CITRATE 50 MCG: 50 INJECTION, SOLUTION INTRAMUSCULAR; INTRAVENOUS at 15:18

## 2024-02-13 RX ADMIN — OXYCODONE HYDROCHLORIDE 10 MG: 5 SOLUTION ORAL at 15:50

## 2024-02-13 RX ADMIN — FENTANYL CITRATE 50 MCG: 50 INJECTION, SOLUTION INTRAMUSCULAR; INTRAVENOUS at 16:05

## 2024-02-13 RX ADMIN — ROCURONIUM BROMIDE 40 MG: 50 INJECTION, SOLUTION INTRAVENOUS at 14:39

## 2024-02-13 RX ADMIN — FENTANYL CITRATE 100 MCG: 50 INJECTION, SOLUTION INTRAMUSCULAR; INTRAVENOUS at 14:39

## 2024-02-13 RX ADMIN — SODIUM CHLORIDE, POTASSIUM CHLORIDE, SODIUM LACTATE AND CALCIUM CHLORIDE: 600; 310; 30; 20 INJECTION, SOLUTION INTRAVENOUS at 14:37

## 2024-02-13 RX ADMIN — CEFTRIAXONE SODIUM 2000 MG: 2 INJECTION, POWDER, FOR SOLUTION INTRAMUSCULAR; INTRAVENOUS at 12:35

## 2024-02-13 RX ADMIN — SODIUM CHLORIDE, POTASSIUM CHLORIDE, SODIUM LACTATE AND CALCIUM CHLORIDE: 600; 310; 30; 20 INJECTION, SOLUTION INTRAVENOUS at 23:46

## 2024-02-13 RX ADMIN — FENTANYL CITRATE 50 MCG: 50 INJECTION, SOLUTION INTRAMUSCULAR; INTRAVENOUS at 16:15

## 2024-02-13 RX ADMIN — SUGAMMADEX 200 MG: 100 INJECTION, SOLUTION INTRAVENOUS at 15:28

## 2024-02-13 RX ADMIN — HYDROMORPHONE HYDROCHLORIDE 0.5 MG: 1 INJECTION, SOLUTION INTRAMUSCULAR; INTRAVENOUS; SUBCUTANEOUS at 23:47

## 2024-02-13 RX ADMIN — FENTANYL CITRATE 50 MCG: 50 INJECTION, SOLUTION INTRAMUSCULAR; INTRAVENOUS at 15:52

## 2024-02-13 RX ADMIN — INDOCYANINE GREEN AND WATER 2.5 MG: KIT at 14:02

## 2024-02-13 RX ADMIN — SODIUM CHLORIDE, POTASSIUM CHLORIDE, SODIUM LACTATE AND CALCIUM CHLORIDE: 600; 310; 30; 20 INJECTION, SOLUTION INTRAVENOUS at 09:36

## 2024-02-13 RX ADMIN — PROPOFOL 200 MG: 10 INJECTION, EMULSION INTRAVENOUS at 14:39

## 2024-02-13 ASSESSMENT — PAIN DESCRIPTION - PAIN TYPE
TYPE: SURGICAL PAIN
TYPE: ACUTE PAIN
TYPE: SURGICAL PAIN

## 2024-02-13 ASSESSMENT — ENCOUNTER SYMPTOMS
VOMITING: 0
HEADACHES: 0
CHILLS: 0
CONSTIPATION: 0
ABDOMINAL PAIN: 1
NAUSEA: 0
DIZZINESS: 0
SHORTNESS OF BREATH: 0
PALPITATIONS: 0
DIARRHEA: 0
COUGH: 0
BACK PAIN: 0
FEVER: 0

## 2024-02-13 ASSESSMENT — PAIN SCALES - GENERAL: PAIN_LEVEL: 4

## 2024-02-13 NOTE — PROGRESS NOTES
Received patient from Night shift RN . Patient is awake and alert.No sign of distress. Remained on strict NPO. Denies any n/v or pain at this time. For surgery this afternoon. Fall precaution in place, kept bed in lowest position and call light within reach.    1647-patient back to floor after surgery.   4 lap sites noted with derma bond, SHAWANDA  On 1 liter via NC.

## 2024-02-13 NOTE — PROGRESS NOTES
[facility] Medicine Daily Progress Note    Date of Service  2/13/2024    Chief Complaint  Sandra Krishnan is a 28 y.o. female admitted 2/11/2024 with   Transfer from [facility]    Hospital Course  No notes on file    Interval Problem Update  Patient stated she had mild epigastric to right upper quadrant pain.  I reviewed labs with patient at bedside, , , , total bili 7.2, direct bili 1.3, indirect bili 5.9.  I reviewed CT scan and MRCP with patient at bedside.  I discussed case with Dr. Phelan, we will proceed with surgery today at 3 PM    I have discussed this patient's plan of care and discharge plan at IDT rounds today with Case Management, Nursing, Nursing leadership, and other members of the IDT team.    Consultants/Specialty  general surgery    Code Status  Full Code    Disposition  The patient is not medically cleared for discharge to home or a post-acute facility.  Anticipate discharge to: home with close outpatient follow-up    I have placed the appropriate orders for post-discharge needs.    Review of Systems  Review of Systems   Constitutional:  Negative for chills, fever and malaise/fatigue.   Respiratory:  Negative for cough and shortness of breath.    Cardiovascular:  Negative for chest pain and palpitations.   Gastrointestinal:  Positive for abdominal pain. Negative for constipation, diarrhea, nausea and vomiting.   Musculoskeletal:  Negative for back pain and joint pain.   Skin:         Jaundice+   Neurological:  Negative for dizziness and headaches.   All other systems reviewed and are negative.       Physical Exam  Temp:  [36.8 °C (98.3 °F)-36.9 °C (98.5 °F)] 36.9 °C (98.4 °F)  Pulse:  [61-88] 75  Resp:  [16-18] 16  BP: (104-111)/(60-61) 104/60  SpO2:  [94 %-96 %] 96 %    Physical Exam  Vitals and nursing note reviewed.   Constitutional:       General: She is not in acute distress.     Appearance: She is not ill-appearing.   HENT:      Head: Normocephalic and  atraumatic.   Eyes:      General: No scleral icterus.     Extraocular Movements: Extraocular movements intact.   Cardiovascular:      Rate and Rhythm: Normal rate.      Pulses: Normal pulses.      Heart sounds: Normal heart sounds. No murmur heard.  Pulmonary:      Effort: Pulmonary effort is normal. No respiratory distress.      Breath sounds: Normal breath sounds.   Abdominal:      General: Abdomen is flat. Bowel sounds are normal. There is no distension.      Palpations: Abdomen is soft.   Musculoskeletal:         General: No swelling or tenderness. Normal range of motion.      Cervical back: Normal range of motion and neck supple.   Skin:     General: Skin is warm.      Capillary Refill: Capillary refill takes less than 2 seconds.      Coloration: Skin is jaundiced.      Findings: No erythema.   Neurological:      General: No focal deficit present.      Mental Status: She is alert and oriented to person, place, and time. Mental status is at baseline.      Motor: No weakness.   Psychiatric:         Mood and Affect: Mood normal.         Behavior: Behavior normal.         Thought Content: Thought content normal.         Judgment: Judgment normal.         Fluids    Intake/Output Summary (Last 24 hours) at 2/13/2024 1017  Last data filed at 2/13/2024 0736  Gross per 24 hour   Intake 480 ml   Output 400 ml   Net 80 ml       Laboratory  Recent Labs     02/11/24  1348 02/12/24  0235 02/13/24  0318   WBC 16.8* 10.1 6.3   RBC 5.24 4.73 4.40   HEMOGLOBIN 10.8* 9.8* 9.2*   HEMATOCRIT 36.3* 33.0* 31.1*   MCV 69.3* 69.8* 70.7*   MCH 20.6* 20.7* 20.9*   MCHC 29.8* 29.7* 29.6*   RDW 19.2* 42.6 44.2   PLATELETCT 271 283 264   MPV -  --   --      Recent Labs     02/11/24  1348 02/12/24  0235 02/13/24  0318   SODIUM 139 138 139   POTASSIUM 3.4* 4.2 3.8   CHLORIDE 109* 105 107   CO2 19* 19* 25   GLUCOSE 101* 106* 92   BUN 9 6* 5*   CREATININE 0.7 0.37* 0.45*   CALCIUM 9.0 8.3* 8.2*                   Imaging  [id number]   Final  Result      1.  No significant biliary dilation.   2.  No common bile duct stone demonstrated.  Apparent interval passage of previously described stones.   3.  Gallbladder wall thickening suggesting cholecystitis, without gallstone present.   4.  Liver shows periportal edema which may be related to intra-abdominal inflammatory process or aggressive hydration.   5.  Mild splenomegaly.           Assessment/Plan  * Choledocholithiasis- (present on admission)  Assessment & Plan  I reviewed CT scan at bedside with patient from [facility], I showed her the stones that were found on CT scan in the common bile duct.  I showed her her MRI which was negative for any further choledocholithiasis.  She did present with choledocholithiasis which may have moved or passed during her ambulance trip to [facility].    Thickening of wall of gallbladder- (present on admission)  Assessment & Plan  Patient to go to surgery today, I discussed with Dr. Phelan today on patient's case.  3 PM scheduled procedure.  Keep NPO.  Patient unable to have HIDA scan due to bilirubin > 5    Fall- (present on admission)  Assessment & Plan  -Fell forward after jumping with snowboard. Developed RUQ pain 30-40 min after. No signs of trauma in her abdomen and CT Abdomen done at outside facility was negative for acute traumatic injury.   -Hemodynamically stable. Close monitor. Plan as above.      Hyperbilirubinemia- (present on admission)  Assessment & Plan  -Total bilirubin level 6.9. Reports baseline elevated Bilirubin with h/o Thalassemia  - total bili 7.2, direct bili 1.3, indirect bili 5.9.  At this time she has mainly indirect bilirubinemia due to thalassemia.  She had passed gallstones    Hypokalemia- (present on admission)  Assessment & Plan  Potassium 3.8    Beta-thalassemia (HCC)- (present on admission)  Assessment & Plan  -Initial hemoglobin is 10.8.  Patient states this is her baseline with underlying history of thalassemia.  -She is hemodynamically  stable with normal vital signs on admission.    -Hemoglobin 9.8, MCV 69.8  - Mentzer Index 14.8 indicating TIMOTHY (over 13),   Ferritin 1900+, iron levels unable to be detected.  Patient to follow-up with her hematologist at [facility] in [facility]  I reviewed patient's out side [facility] records, she has beta thalassemia trait diagnosed by Dr. Richard.  She does not have alpha thalassemia on PCR.  In 2020 she had an ERCP for stone extraction and had been receiving once a month acupuncture since then as a prophylaxis for cholecystitis.         VTE prophylaxis:   SCDs/TEDs      I have performed a physical exam and reviewed and updated ROS and Plan today (2/13/2024). In review of yesterday's note (2/12/2024), there are no changes except as documented above.    Total time spent 52 minutes.    This included my review of patient's overnight RN notes, face to face interview, physical examination, lab analysis.  Also includes repeat visits with the patient, and my documented assessments and interventions above.  I have spoken with specialist from general surgeon Dr. Phelan.  In addition, I spoke with entire care team on patient's treatment plan, and DC planning on morning rounds and IDT rounds.

## 2024-02-13 NOTE — DISCHARGE INSTRUCTIONS
What to Expect Post Anesthesia    Rest and take it easy for the first 24 hours.  A responsible adult is recommended to remain with you during that time.  It is normal to feel sleepy.  We encourage you to not do anything that requires balance, judgment or coordination.    FOR 24 HOURS DO NOT:  Drive, operate machinery or run household appliances.  Drink beer or alcoholic beverages.  Make important decisions or sign legal documents.    To avoid nausea, slowly advance diet as tolerated, avoiding spicy or greasy foods for the first day.  Add more substantial food to your diet according to your provider's instructions. INCREASE FLUIDS AND FIBER TO AVOID CONSTIPATION.    MILD FLU-LIKE SYMPTOMS ARE NORMAL.  YOU MAY EXPERIENCE GENERALIZED MUSCLE ACHES, THROAT IRRITATION, HEADACHE AND/OR SOME NAUSEA.    If any questions arise, call your provider.  If your provider is not available, please feel free to call the Surgical Center at [phone].    MEDICATIONS: Resume taking daily medication.  Take prescribed pain medication with food.  If no medication is prescribed, you may take non-aspirin pain medication if needed.  PAIN MEDICATION CAN BE VERY CONSTIPATING.  Take a stool softener or laxative such as senokot, pericolace, or milk of magnesia if needed.    Last pain medication given at     Diet    Resume your normal diet as tolerated.  A diet low in cholesterol, fat, and sodium is recommended for good health.     Laparoscopic Cholecystectomy Discharge instructions    ACTIVITIES: After discharge from the hospital, you may resume full routine activities. However, there should be no heavy lifting (greater than 15 pounds) and no strenuous activities until after your follow-up visit. Otherwise, routine activities of daily living are acceptable.    DRIVING: You may drive whenever you are off pain medications and are able to perform the activities needed to drive, i.e. turning, bending, twisting, etc.    BATHING: You may get the  wound wet at any time after leaving the hospital. You may shower, but do not submerge in a bath for at least a week.    WOUND CARE:    It is normal to have tenderness, discomfort or mild swelling at the site of the incisions.  If you have wound dressings, they may come off after 48 hours. If you have skin glue to the wound, this will fall off on its own, do not pick at it. If you have steri strips to the wound, these will fall off on their own, do not pick at them, may trim the edges if needed.    Avoid getting lotions, powders or deodorant on the incision while it is healing. Don't worry if the area under either incision feels firm or hard. This is normal and usually softens within a few months.    BOWEL FUNCTION: A few patients, after this operation, will develop either frequent or loose stools after meals. This usually corrects itself after a few days, to a few weeks. If this occurs, do not worry; it is not unusual and will resolve. Much more common than loose stools, is constipation. The combination of pain medication and decreased activity level can cause constipation in otherwise normal patients. If you feel this is occurring, take a laxative (Milk of Magnesia, Ex-Lax, Senokot, etc.) until the problem has resolved.    It also helps to stay regular by including fiber in your diet (for example: bran or fruits and vegetables) and drink plenty of liquids (water, juice, etc.).

## 2024-02-13 NOTE — PROGRESS NOTES
Surgical Progress Note          HPI:  28-year-old female with thalassemia and prior choledocholithiasis    Interval Events:  MRI negative for choledocholithiasis, did demonstrate evidence of cholecystitis.  White blood cell count normalized this morning.  Patient expresses great resistance to undergoing cholecystectomy.        ROS  Hemodynamics:  Temp (24hrs), Av.7 °C (98.1 °F), Min:36.3 °C (97.4 °F), Max:37.1 °C (98.7 °F)  Temperature: 36.9 °C (98.5 °F)  Pulse  Av.1  Min: 60  Max: 75   Blood Pressure: 111/61     Respiratory:    Respiration: 18, Pulse Oximetry: 94 %     Work Of Breathing / Effort: Within Normal Limits     Neuro:  GCS       Fluids:    Intake/Output Summary (Last 24 hours) at 2024 1711  Last data filed at 2024 1200  Gross per 24 hour   Intake --   Output 700 ml   Net -700 ml     Weight: 57.2 kg (126 lb 1.7 oz)  Current Diet Order   Procedures    Diet Order Diet: Clear Liquid    Diet NPO Restrict to: Sips with Medications     Physical Exam  Appears well  Neck supple  Regular heart rate  Normal respiratory effort  Abdomen soft, mild tenderness in the right upper quadrant  Extremities perfused  Skin pink and warm        Labs:  Recent Results (from the past 24 hour(s))   CBC without Differential    Collection Time: 24  2:35 AM   Result Value Ref Range    WBC 10.1 4.8 - 10.8 K/uL    RBC 4.73 4.20 - 5.40 M/uL    Hemoglobin 9.8 (L) 12.0 - 16.0 g/dL    Hematocrit 33.0 (L) 37.0 - 47.0 %    MCV 69.8 (L) 81.4 - 97.8 fL    MCH 20.7 (L) 27.0 - 33.0 pg    MCHC 29.7 (L) 32.2 - 35.5 g/dL    RDW 42.6 35.9 - 50.0 fL    Platelet Count 283 164 - 446 K/uL   Comp Metabolic Panel (CMP)    Collection Time: 24  2:35 AM   Result Value Ref Range    Sodium 138 135 - 145 mmol/L    Potassium 4.2 3.6 - 5.5 mmol/L    Chloride 105 96 - 112 mmol/L    Co2 19 (L) 20 - 33 mmol/L    Anion Gap 14.0 7.0 - 16.0    Glucose 106 (H) 65 - 99 mg/dL    Bun 6 (L) 8 - 22 mg/dL    Creatinine 0.37 (L) 0.50 - 1.40  mg/dL    Calcium 8.3 (L) 8.4 - 10.2 mg/dL    Correct Calcium 8.1 (L) 8.5 - 10.5 mg/dL    AST(SGOT) 367 (H) 12 - 45 U/L    ALT(SGPT) 419 (H) 2 - 50 U/L    Alkaline Phosphatase 86 30 - 99 U/L    Total Bilirubin 10.8 (H) 0.1 - 1.5 mg/dL    Albumin 4.3 3.2 - 4.9 g/dL    Total Protein 6.3 6.0 - 8.2 g/dL    Globulin 2.0 1.9 - 3.5 g/dL    A-G Ratio 2.2 g/dL   LIPASE    Collection Time: 02/12/24  2:35 AM   Result Value Ref Range    Lipase 18 11 - 82 U/L   ESTIMATED GFR    Collection Time: 02/12/24  2:35 AM   Result Value Ref Range    GFR (CKD-EPI) 142 >60 mL/min/1.73 m 2   IRON/TOTAL IRON BIND    Collection Time: 02/12/24  2:37 AM   Result Value Ref Range    Iron 147 40 - 170 ug/dL    Total Iron Binding see below 250 - 450 ug/dL    Unsat Iron Binding <17 (L) 110 - 370 ug/dL    % Saturation see below 15 - 55 %   FERRITIN    Collection Time: 02/12/24  2:37 AM   Result Value Ref Range    Ferritin 1934.0 (H) 10.0 - 291.0 ng/mL     Medical Decision Making, by Problem:  Active Hospital Problems    Diagnosis     Choledocholithiasis [K80.50]     Thalassemia [D56.9]     Hypokalemia [E87.6]     Hyperbilirubinemia [E80.6]     Fall [W19.XXXA]      Plan:  28-year-old female with history of thalassemia and choledocholithiasis  1.  Appreciate medical care  2.  DVT prophylaxis  3.  IV antibiotics  4.  N.p.o.  5.  Patient is very resistant to undergoing cholecystectomy, does not believe she has cholecystitis or evidence of infection.  We discussed a HIDA scan would be more sensitive for cystic duct occlusion we will plan to proceed with this when able    Quality Measures:  Quality-Core Measures    Discussed patient condition with Hospitalist and Patient

## 2024-02-13 NOTE — OP REPORT
OP Note    PreOp Diagnosis:   1.  Choledocholithiasis  2.  Acute cholecystitis    PostOp Diagnosis:  1.  Choledocholithiasis  2.  Acute cholecystitis    Procedure(s):  1.  Robotic assisted laparoscopic cholecystectomy  2.  Indocyanine green cholangiography    Wound Class: Clean Contaminated    Surgeon(s):  Rayshawn Phelan M.D.    Anesthesiologist/Type of Anesthesia:  Anesthesiologist: Eileen Bronson M.D./General    Surgical Staff:  Circulator: Jim Ferreira R.N.  Scrub Person: Torrey Markham    Specimens removed if any:  ID Type Source Tests Collected by Time Destination   A : Gallbladder Tissue Gallbladder PATHOLOGY SPECIMEN Rayshawn Phelan M.D. 2/13/2024  3:03 PM        Estimated Blood Loss: 10 cc    Findings:  1.  Acute inflammatory process of the gallbladder and pericholecystic fluid  2.  Periportal edema  3.  Biliary ascites    Complications: None observed    Description:    The patient was met in the preoperative holding area where all of the potential risks and benefits of the procedure were discussed in detail with the patient. All of her questions were answered to her satisfaction and informed consent was signed. The patient was taken back to the operating room and placed supine on the operating room table. General endotracheal anesthesia was induced and all of the patients pressure points were padded appropriately. The patients abdomen was prepped and draped in the usual sterile fashion.  A timeout was performed which correctly identified the patient and the procedure being performed and preoperative antibiotics were given according to SCIP guidelines.     The operation was begun with infiltration of 0.5% marcaine with epinephrine into the left upper quadrant skin. A small incision was created and the verress needle placed through the abdominal wall and into the abdominal cavity. Insufflation was carried out to 15mmHg and an 8mm robotic trochar  was introduced through this  incision and into the abdominal cavity. A brief survey of the abdominal cavity demonstrated a distended gallbladder with mild inflammatory reaction in the surrounding tissue as well as biliary ascites.  3 additional robotic trochars were then placed in a horizontal configuration across the mid abdomen after the skin and peritoneum were infiltrated with 0.5% marcaine with epinephrine. The gallbladder was located in its right upper quadrant position and retracted superiorly with the grasping retractor.  The contents of the hepatocystic triangle were dissected clearly free.  Indocyanine green cholangiography was used to confirm ductal anatomy.  Once the critical view of safety was obtained, the cystic duct was triply clipped on the common duct side and singly clipped on the gallbladder side and then transected. The cystic artery was dealt in a similar fashion. Electrocautery was used to remove the gallbladder from its attachments to the liver bed.     The right upper quadrant was copiously irrigated until clear. The clips were in place without biliary spillage at the end of the case. Hemostasis was meticulously achieved.  I then retrieved the specimen via an Endo Catch device.  A 0 Vicryl suture was used to reapproximate the fascia of the extraction trochar in the left mid abdomen. Monocryl was used to reapproximate skin edges. Dermabond was applied as a dressing.     The patient tolerated the procedure well and was extubated at the conclusion of the case without incident. All of the sponge, needle and instrument counts were correct at the conclusion of the case. After she was awoken from anesthesia she was taken to the recovery room in stable condition.         2/13/2024 3:33 PM Rayshawn Phelan M.D.

## 2024-02-13 NOTE — CARE PLAN
The patient is Stable - Low risk of patient condition declining or worsening    Shift Goals  Clinical Goals: Pt will sleep at least 4 hours tonight  Patient Goals: Pain control and discharge home    Progress made toward(s) clinical / shift goals:  Pt has been able to sleep on and off throughout the night. Educated pt of fall precautions and that she will be NPO tonight. Bed in low position and call light within reach.     Patient is not progressing towards the following goals:

## 2024-02-13 NOTE — PROGRESS NOTES
Surgical Progress Note          HPI:  28-year-old female status post snowboarding fall and subsequent right upper quadrant pain.  She underwent workup in the emergency room is found to have evidence of choledocholithiasis and was transferred to [facility]      Interval Events:  Feels well today.  We discussed her prior CAT scan at [facility] which did demonstrate cholelithiasis.  MRI performed here does not demonstrate evidence of choledocholithiasis  But does have evidence of cholecystitis.  White blood cell count has normalized      ROS  Hemodynamics:  Temp (24hrs), Av.9 °C (98.4 °F), Min:36.8 °C (98.3 °F), Max:36.9 °C (98.5 °F)  Temperature: 36.8 °C (98.3 °F)  Pulse  Av.3  Min: 60  Max: 88   Blood Pressure: 127/63     Respiratory:    Respiration: 16, Pulse Oximetry: 98 %     Work Of Breathing / Effort: Within Normal Limits     Neuro:  GCS       Fluids:    Intake/Output Summary (Last 24 hours) at 2024 1252  Last data filed at 2024 0736  Gross per 24 hour   Intake 480 ml   Output --   Net 480 ml        Current Diet Order   Procedures    Diet NPO Restrict to: Strict     Physical Exam  Appears well  Neck supple  Regular heart rate  No respiratory effort  Abdomen soft, mild tenderness in the right upper quadrant  Extremities perfused  Skin pink and warm        Labs:  Recent Results (from the past 24 hour(s))   HEPATIC FUNCTION PANEL    Collection Time: 24  3:18 AM   Result Value Ref Range    Alkaline Phosphatase 109 (H) 30 - 99 U/L    AST(SGOT) 190 (H) 12 - 45 U/L    ALT(SGPT) 392 (H) 2 - 50 U/L    Total Bilirubin 7.2 (H) 0.1 - 1.5 mg/dL    Direct Bilirubin 1.3 (H) 0.1 - 0.5 mg/dL    Indirect Bilirubin 5.9 (H) 0.0 - 1.0 mg/dL    Albumin 3.8 3.2 - 4.9 g/dL    Total Protein 5.7 (L) 6.0 - 8.2 g/dL   Renal Function Panel    Collection Time: 24  3:18 AM   Result Value Ref Range    Sodium 139 135 - 145 mmol/L    Potassium 3.8 3.6 - 5.5 mmol/L    Chloride 107 96 -  112 mmol/L    Co2 25 20 - 33 mmol/L    Glucose 92 65 - 99 mg/dL    Creatinine 0.45 (L) 0.50 - 1.40 mg/dL    Bun 5 (L) 8 - 22 mg/dL    Calcium 8.2 (L) 8.4 - 10.2 mg/dL    Correct Calcium 8.4 (L) 8.5 - 10.5 mg/dL    Phosphorus 3.2 2.5 - 4.5 mg/dL   MAGNESIUM    Collection Time: 02/13/24  3:18 AM   Result Value Ref Range    Magnesium 2.0 1.5 - 2.5 mg/dL   CBC WITHOUT DIFFERENTIAL    Collection Time: 02/13/24  3:18 AM   Result Value Ref Range    WBC 6.3 4.8 - 10.8 K/uL    RBC 4.40 4.20 - 5.40 M/uL    Hemoglobin 9.2 (L) 12.0 - 16.0 g/dL    Hematocrit 31.1 (L) 37.0 - 47.0 %    MCV 70.7 (L) 81.4 - 97.8 fL    MCH 20.9 (L) 27.0 - 33.0 pg    MCHC 29.6 (L) 32.2 - 35.5 g/dL    RDW 44.2 35.9 - 50.0 fL    Platelet Count 264 164 - 446 K/uL   ESTIMATED GFR    Collection Time: 02/13/24  3:18 AM   Result Value Ref Range    GFR (CKD-EPI) 135 >60 mL/min/1.73 m 2     Medical Decision Making, by Problem:  Active Hospital Problems    Diagnosis     Thickening of wall of gallbladder [K82.8]     Choledocholithiasis [K80.50]     Beta-thalassemia (HCC) [D56.1]     Hypokalemia [E87.6]     Hyperbilirubinemia [E80.6]     Fall [W19.XXXA]      Plan:  28-year-old female with recently resolved choledocholithiasis as well as evidence of cholecystitis  1.  Had long discussion with patient regarding indications for cholecystectomy.  I told her that I would recommend undergoing cholecystectomy in order to prevent future episodes of choledocholithiasis as well as to address cholecystitis  2.  Appreciate medical care  3.  IV antibiotics  4.  DVT prophylaxis  5.  N.p.o.  6.  Will proceed with robotic assisted laparoscopic cholecystectomy today   7.  Risk and benefits of the procedure were discussed in detail, including biliary leak and injury.  Patient expresses understanding and wishes to proceed.  All questions answered    Quality Measures:  Quality-Core Measures    Discussed patient condition with Hospitalist and Patient

## 2024-02-13 NOTE — OR NURSING
1540-To PACU from OR via bed,sleeping, respirations spontaneous and non-labored. Icepack applied over c/d/i Abdominal surgical sites.     1550-Pt rouses, Pt c/o 8/10 Pain. Pt given water to drink. Pt medicated with Oral and IV pain medication. SEE MAR.     1600- Pt sleeping not roused at this time.     1605-Pt rouses. Pt c/o pain 8/10 surgical abdominal pain. See MAR. Pt requesting to got to the bathroom. Pt placed on a bedpan. Pt unable to urinate at this time.     1615- Pt c/o pain 8/10 surgical abdominal pain. See MAR.     1620-Report to Alfredo MEAD.

## 2024-02-13 NOTE — CARE PLAN
The patient is Stable - Low risk of patient condition declining or worsening    Shift Goals  Clinical Goals: Tolerate NPO status, pain controlled to less than 4  Patient Goals: Pain control and discharge home    Progress made toward(s) clinical / shift goals:  Patient tolerated NPO status and regular diet. No nausea nor vomiting.     Patient is not progressing towards the following goals:

## 2024-02-13 NOTE — ASSESSMENT & PLAN NOTE
Patient to go to surgery today, I discussed with Dr. Phelan today on patient's case.  3 PM scheduled procedure.  Keep NPO.  Patient unable to have HIDA scan due to bilirubin > 5

## 2024-02-13 NOTE — ANESTHESIA PREPROCEDURE EVALUATION
Case: [id number] Date/Time: 02/13/24 1453    Procedure: CHOLECYSTECTOMY, ROBOT-ASSISTED, USING DA DIANE XI - with ICG    Anesthesia type: General    Location:  OR  / SURGERY [facility]    Surgeons: Rayshawn Phelan M.D.            Relevant Problems   No relevant active problems       Physical Exam    Airway   Mallampati: II  TM distance: >3 FB       Cardiovascular   Rhythm: regular  Rate: normal     Dental - normal exam           Pulmonary   Breath sounds clear to auscultation     Abdominal - normal exam     Neurological                Anesthesia Plan    ASA 2       Plan - general       Airway plan will be ETT          Induction: intravenous    Postoperative Plan: Postoperative administration of opioids is intended.    Pertinent diagnostic labs and testing reviewed    Informed Consent:    Anesthetic plan and risks discussed with patient and mother.    Use of blood products discussed with: whom consented to blood products.

## 2024-02-13 NOTE — ANESTHESIA PROCEDURE NOTES
Airway    Date/Time: 2/13/2024 2:44 PM    Performed by: Eileen Bronson M.D.  Authorized by: Eileen Bronson M.D.    Location:  OR  Urgency:  Elective  Difficult Airway: No    Indications for Airway Management:  Anesthesia      Spontaneous Ventilation: absent    Sedation Level:  Deep  Preoxygenated: Yes    Patient Position:  Sniffing  MILS Maintained Throughout: No    Mask Difficulty Assessment:  1 - vent by mask  Final Airway Type:  Endotracheal airway  Final Endotracheal Airway:  ETT  Cuffed: Yes    Technique Used for Successful ETT Placement:  Direct laryngoscopy  Devices/Methods Used in Placement:  Intubating stylet    Insertion Site:  Oral  Blade Type:  Olivarez  Laryngoscope Blade/Videolaryngoscope Blade Size:  2  ETT Size (mm):  6.5  Placement Verified by: capnometry    Cormack-Lehane Classification:  Grade I - full view of glottis  Number of Attempts at Approach:  1

## 2024-02-13 NOTE — ANESTHESIA TIME REPORT
Anesthesia Start and Stop Event Times       Date Time Event    2/13/2024 1405 Ready for Procedure     1437 Anesthesia Start     1542 Anesthesia Stop          Responsible Staff  02/13/24      Name Role Begin End    Eileen Bronson M.D. Anesth 1437 1542          Overtime Reason:  no overtime (within assigned shift)    Comments:

## 2024-02-14 ENCOUNTER — PHARMACY VISIT (OUTPATIENT)
Dept: PHARMACY | Facility: MEDICAL CENTER | Age: 29
End: 2024-02-14
Payer: MEDICARE

## 2024-02-14 VITALS
RESPIRATION RATE: 15 BRPM | TEMPERATURE: 97.4 F | OXYGEN SATURATION: 96 % | SYSTOLIC BLOOD PRESSURE: 110 MMHG | WEIGHT: 126.1 LBS | BODY MASS INDEX: 23.21 KG/M2 | HEART RATE: 60 BPM | HEIGHT: 62 IN | DIASTOLIC BLOOD PRESSURE: 58 MMHG

## 2024-02-14 PROBLEM — E87.6 HYPOKALEMIA: Status: RESOLVED | Noted: 2024-02-11 | Resolved: 2024-02-14

## 2024-02-14 PROBLEM — K80.50 CHOLEDOCHOLITHIASIS: Status: RESOLVED | Noted: 2024-02-11 | Resolved: 2024-02-14

## 2024-02-14 PROBLEM — K81.0 ACUTE CHOLECYSTITIS: Status: RESOLVED | Noted: 2024-02-12 | Resolved: 2024-02-14

## 2024-02-14 PROBLEM — K82.8 THICKENING OF WALL OF GALLBLADDER: Status: RESOLVED | Noted: 2024-02-12 | Resolved: 2024-02-14

## 2024-02-14 LAB
ALBUMIN SERPL BCP-MCNC: 3.9 G/DL (ref 3.2–4.9)
ALBUMIN/GLOB SERPL: 1.7 G/DL
ALP SERPL-CCNC: 98 U/L (ref 30–99)
ALT SERPL-CCNC: 270 U/L (ref 2–50)
ANION GAP SERPL CALC-SCNC: 8 MMOL/L (ref 7–16)
AST SERPL-CCNC: 80 U/L (ref 12–45)
BASOPHILS # BLD AUTO: 0.5 % (ref 0–1.8)
BASOPHILS # BLD: 0.06 K/UL (ref 0–0.12)
BILIRUB SERPL-MCNC: 4.7 MG/DL (ref 0.1–1.5)
BUN SERPL-MCNC: 5 MG/DL (ref 8–22)
CALCIUM ALBUM COR SERPL-MCNC: 9 MG/DL (ref 8.5–10.5)
CALCIUM SERPL-MCNC: 8.9 MG/DL (ref 8.4–10.2)
CHLORIDE SERPL-SCNC: 103 MMOL/L (ref 96–112)
CO2 SERPL-SCNC: 27 MMOL/L (ref 20–33)
CREAT SERPL-MCNC: 0.52 MG/DL (ref 0.5–1.4)
EOSINOPHIL # BLD AUTO: 0.06 K/UL (ref 0–0.51)
EOSINOPHIL NFR BLD: 0.5 % (ref 0–6.9)
ERYTHROCYTE [DISTWIDTH] IN BLOOD BY AUTOMATED COUNT: 42.4 FL (ref 35.9–50)
GFR SERPLBLD CREATININE-BSD FMLA CKD-EPI: 131 ML/MIN/1.73 M 2
GLOBULIN SER CALC-MCNC: 2.3 G/DL (ref 1.9–3.5)
GLUCOSE SERPL-MCNC: 109 MG/DL (ref 65–99)
HCT VFR BLD AUTO: 32.3 % (ref 37–47)
HGB BLD-MCNC: 9.7 G/DL (ref 12–16)
IMM GRANULOCYTES # BLD AUTO: 0.03 K/UL (ref 0–0.11)
IMM GRANULOCYTES NFR BLD AUTO: 0.3 % (ref 0–0.9)
LYMPHOCYTES # BLD AUTO: 2.1 K/UL (ref 1–4.8)
LYMPHOCYTES NFR BLD: 17.5 % (ref 22–41)
MCH RBC QN AUTO: 21 PG (ref 27–33)
MCHC RBC AUTO-ENTMCNC: 30 G/DL (ref 32.2–35.5)
MCV RBC AUTO: 69.8 FL (ref 81.4–97.8)
MONOCYTES # BLD AUTO: 0.82 K/UL (ref 0–0.85)
MONOCYTES NFR BLD AUTO: 6.8 % (ref 0–13.4)
NEUTROPHILS # BLD AUTO: 8.92 K/UL (ref 1.82–7.42)
NEUTROPHILS NFR BLD: 74.4 % (ref 44–72)
NRBC # BLD AUTO: 0.04 K/UL
NRBC BLD-RTO: 0.3 /100 WBC (ref 0–0.2)
PLATELET # BLD AUTO: 339 K/UL (ref 164–446)
POTASSIUM SERPL-SCNC: 4.3 MMOL/L (ref 3.6–5.5)
PROT SERPL-MCNC: 6.2 G/DL (ref 6–8.2)
RBC # BLD AUTO: 4.63 M/UL (ref 4.2–5.4)
SODIUM SERPL-SCNC: 138 MMOL/L (ref 135–145)
WBC # BLD AUTO: 12 K/UL (ref 4.8–10.8)

## 2024-02-14 PROCEDURE — 80053 COMPREHEN METABOLIC PANEL: CPT

## 2024-02-14 PROCEDURE — 85025 COMPLETE CBC W/AUTO DIFF WBC: CPT

## 2024-02-14 PROCEDURE — 700105 HCHG RX REV CODE 258: Performed by: INTERNAL MEDICINE

## 2024-02-14 PROCEDURE — 36415 COLL VENOUS BLD VENIPUNCTURE: CPT

## 2024-02-14 PROCEDURE — 700102 HCHG RX REV CODE 250 W/ 637 OVERRIDE(OP): Performed by: HOSPITALIST

## 2024-02-14 PROCEDURE — A9270 NON-COVERED ITEM OR SERVICE: HCPCS | Performed by: HOSPITALIST

## 2024-02-14 PROCEDURE — 700111 HCHG RX REV CODE 636 W/ 250 OVERRIDE (IP): Mod: JZ | Performed by: HOSPITALIST

## 2024-02-14 PROCEDURE — 99239 HOSP IP/OBS DSCHRG MGMT >30: CPT | Performed by: INTERNAL MEDICINE

## 2024-02-14 PROCEDURE — RXMED WILLOW AMBULATORY MEDICATION CHARGE: Performed by: INTERNAL MEDICINE

## 2024-02-14 PROCEDURE — 700111 HCHG RX REV CODE 636 W/ 250 OVERRIDE (IP): Mod: JZ | Performed by: INTERNAL MEDICINE

## 2024-02-14 PROCEDURE — 94760 N-INVAS EAR/PLS OXIMETRY 1: CPT

## 2024-02-14 RX ORDER — OXYCODONE HYDROCHLORIDE 5 MG/1
5 TABLET ORAL
Status: DISCONTINUED | OUTPATIENT
Start: 2024-02-14 | End: 2024-02-14 | Stop reason: HOSPADM

## 2024-02-14 RX ORDER — HYDROMORPHONE HYDROCHLORIDE 1 MG/ML
0.25 INJECTION, SOLUTION INTRAMUSCULAR; INTRAVENOUS; SUBCUTANEOUS
Status: DISCONTINUED | OUTPATIENT
Start: 2024-02-14 | End: 2024-02-14 | Stop reason: HOSPADM

## 2024-02-14 RX ORDER — AMOXICILLIN AND CLAVULANATE POTASSIUM 875; 125 MG/1; MG/1
1 TABLET, FILM COATED ORAL 2 TIMES DAILY
Qty: 10 TABLET | Refills: 0 | Status: ACTIVE | OUTPATIENT
Start: 2024-02-14 | End: 2024-02-19

## 2024-02-14 RX ORDER — OXYCODONE HYDROCHLORIDE 5 MG/1
2.5 TABLET ORAL
Status: DISCONTINUED | OUTPATIENT
Start: 2024-02-14 | End: 2024-02-14 | Stop reason: HOSPADM

## 2024-02-14 RX ORDER — OXYCODONE HYDROCHLORIDE 5 MG/1
5 TABLET ORAL EVERY 8 HOURS PRN
Qty: 9 TABLET | Refills: 0 | Status: SHIPPED | OUTPATIENT
Start: 2024-02-14 | End: 2024-02-17

## 2024-02-14 RX ADMIN — HYDROMORPHONE HYDROCHLORIDE 0.5 MG: 1 INJECTION, SOLUTION INTRAMUSCULAR; INTRAVENOUS; SUBCUTANEOUS at 04:56

## 2024-02-14 RX ADMIN — DOCUSATE SODIUM 50MG AND SENNOSIDES 8.6MG 2 TABLET: 8.6; 5 TABLET, FILM COATED ORAL at 04:57

## 2024-02-14 RX ADMIN — CEFTRIAXONE SODIUM 2000 MG: 2 INJECTION, POWDER, FOR SOLUTION INTRAMUSCULAR; INTRAVENOUS at 11:25

## 2024-02-14 ASSESSMENT — PAIN DESCRIPTION - PAIN TYPE
TYPE: SURGICAL PAIN

## 2024-02-14 NOTE — ANESTHESIA POSTPROCEDURE EVALUATION
Patient: Sandra Krishnan    Procedure Summary       Date: 02/13/24 Room / Location:  OR  / SURGERY [facility]    Anesthesia Start: 1437 Anesthesia Stop: 1542    Procedure: CHOLECYSTECTOMY, ROBOT-ASSISTED, USING DA DIANE XI (Abdomen) Diagnosis: (choledocholithiasis as well as evidence of cholecystitis)    Surgeons: Rayshawn Phelan M.D. Responsible Provider: Eileen Bronson M.D.    Anesthesia Type: general ASA Status: 2            Final Anesthesia Type: general  Last vitals  BP   Blood Pressure: 128/74    Temp   37.2 °C (98.9 °F)    Pulse   71   Resp   15    SpO2   99 %      Anesthesia Post Evaluation    Patient location during evaluation: PACU  Patient participation: complete - patient participated  Level of consciousness: awake and alert  Pain score: 4    Airway patency: patent  Anesthetic complications: no  Cardiovascular status: adequate  Respiratory status: acceptable and nasal cannula  Hydration status: acceptable    PONV: none          No notable events documented.     Nurse Pain Score: 6 (NPRS)

## 2024-02-14 NOTE — PROGRESS NOTES
Surgical Progress Note          HPI:  28-year-old female with thalassemia and choledocholithiasis, MRI does not show ongoing ductal stones    Interval Events:  Status post robotic assisted laparoscopic cholecystectomy yesterday.  Patient feels well today.  LFTs are somewhat normal for her, she states that her baseline total bilirubin is approximately 7    ROS  Hemodynamics:  Temp (24hrs), Av.7 °C (98 °F), Min:36.1 °C (97 °F), Max:37.2 °C (98.9 °F)  Temperature: 36.3 °C (97.4 °F)  Pulse  Av.6  Min: 55  Max: 88   Blood Pressure: 110/58     Respiratory:    Respiration: 15, Pulse Oximetry: 96 %     Work Of Breathing / Effort: Within Normal Limits     Neuro:  GCS       Fluids:    Intake/Output Summary (Last 24 hours) at 2024 1244  Last data filed at 2024 0356  Gross per 24 hour   Intake 1700 ml   Output 2610 ml   Net -910 ml        Current Diet Order   Procedures    Diet Order Diet: Low Fiber(GI Soft)     Physical Exam  Appears well  Abdomen soft, incisions clean dry and intact    Labs:  Recent Results (from the past 24 hour(s))   Histology Request    Collection Time: 24  3:03 PM   Result Value Ref Range    Pathology Request Sent to Histo    CBC WITH DIFFERENTIAL    Collection Time: 24  7:45 AM   Result Value Ref Range    WBC 12.0 (H) 4.8 - 10.8 K/uL    RBC 4.63 4.20 - 5.40 M/uL    Hemoglobin 9.7 (L) 12.0 - 16.0 g/dL    Hematocrit 32.3 (L) 37.0 - 47.0 %    MCV 69.8 (L) 81.4 - 97.8 fL    MCH 21.0 (L) 27.0 - 33.0 pg    MCHC 30.0 (L) 32.2 - 35.5 g/dL    RDW 42.4 35.9 - 50.0 fL    Platelet Count 339 164 - 446 K/uL    Neutrophils-Polys 74.40 (H) 44.00 - 72.00 %    Lymphocytes 17.50 (L) 22.00 - 41.00 %    Monocytes 6.80 0.00 - 13.40 %    Eosinophils 0.50 0.00 - 6.90 %    Basophils 0.50 0.00 - 1.80 %    Immature Granulocytes 0.30 0.00 - 0.90 %    Nucleated RBC 0.30 (H) 0.00 - 0.20 /100 WBC    Neutrophils (Absolute) 8.92 (H) 1.82 - 7.42 K/uL    Lymphs (Absolute) 2.10 1.00 - 4.80 K/uL    Monos  (Absolute) 0.82 0.00 - 0.85 K/uL    Eos (Absolute) 0.06 0.00 - 0.51 K/uL    Baso (Absolute) 0.06 0.00 - 0.12 K/uL    Immature Granulocytes (abs) 0.03 0.00 - 0.11 K/uL    NRBC (Absolute) 0.04 K/uL   Comp Metabolic Panel    Collection Time: 02/14/24  7:45 AM   Result Value Ref Range    Sodium 138 135 - 145 mmol/L    Potassium 4.3 3.6 - 5.5 mmol/L    Chloride 103 96 - 112 mmol/L    Co2 27 20 - 33 mmol/L    Anion Gap 8.0 7.0 - 16.0    Glucose 109 (H) 65 - 99 mg/dL    Bun 5 (L) 8 - 22 mg/dL    Creatinine 0.52 0.50 - 1.40 mg/dL    Calcium 8.9 8.4 - 10.2 mg/dL    Correct Calcium 9.0 8.5 - 10.5 mg/dL    AST(SGOT) 80 (H) 12 - 45 U/L    ALT(SGPT) 270 (H) 2 - 50 U/L    Alkaline Phosphatase 98 30 - 99 U/L    Total Bilirubin 4.7 (H) 0.1 - 1.5 mg/dL    Albumin 3.9 3.2 - 4.9 g/dL    Total Protein 6.2 6.0 - 8.2 g/dL    Globulin 2.3 1.9 - 3.5 g/dL    A-G Ratio 1.7 g/dL   ESTIMATED GFR    Collection Time: 02/14/24  7:45 AM   Result Value Ref Range    GFR (CKD-EPI) 131 >60 mL/min/1.73 m 2     Medical Decision Making, by Problem:  Active Hospital Problems    Diagnosis     Beta-thalassemia (HCC) [D56.1]     Hyperbilirubinemia [E80.6]     Fall [W19.XXXA]      Plan:  28-year-old female with acute cholecystitis and choledocholithiasis postop day 1 status post robotic assisted laparoscopic cholecystectomy  1.  Appreciate medical care  2.  Low-fat diet  3.  No heavy lifting (less than 15 pounds) for 6 weeks  4.  Return to clinic via telemedicine appointment in 1 to 2 weeks  5.  Discharge planning    Quality Measures:  Quality-Core Measures    Discussed patient condition with Hospitalist and Patient

## 2024-02-14 NOTE — CARE PLAN
The patient is Stable - Low risk of patient condition declining or worsening    Shift Goals  Clinical Goals: Patient will report pain 3/10 or less, tolerate diet  Patient Goals: Go home tomorrow    Progress made toward(s) clinical / shift goals:  Patient reports pain improvement after interventions, able to ambulate and rest. Patient tolerating diet, denies nausea.    Patient is not progressing towards the following goals: N/A

## 2024-02-14 NOTE — PROGRESS NOTES
4 Eyes Skin Assessment Completed by BOSTON Moreno and BOSTON Manzano.    Head WDL  Ears WDL  Nose WDL  Mouth WDL  Neck WDL  Breast/Chest WDL  Shoulder Blades WDL  Spine WDL  (R) Arm/Elbow/Hand WDL  (L) Arm/Elbow/Hand WDL  Abdomen Incision 4 lap sites with derma bond, SHAWANDA  Groin WDL  Scrotum/Coccyx/Buttocks Redness and Blanching  (R) Leg WDL  (L) Leg WDL  (R) Heel/Foot/Toe WDL  (L) Heel/Foot/Toe WDL          Devices In Places Blood Pressure Cuff and Pulse Ox      Interventions In Place N/A    Possible Skin Injury No    Pictures Uploaded Into Epic N/A  Wound Consult Placed N/A  RN Wound Prevention Protocol Ordered No

## 2024-02-14 NOTE — DISCHARGE SUMMARY
Discharge Summary    CHIEF COMPLAINT ON ADMISSION  No chief complaint on file.      Reason for Admission  choldeocholithiasis     Admission Date  2/11/2024    CODE STATUS  Full Code    HPI & HOSPITAL COURSE  This is a 28 y.o. female with a past medical history of beta thalassemia, prior cholelithiasis with a ERCP in 2020 in [facility], patient visiting from [facility] here with abdominal pain.  She was at [facility] snowboarding when she unfortunately fell injuring her front abdomen.  After falling about 30 to 40 minutes later she started developing severe upper abdominal right upper quadrant pain.  She was evaluated at [facility] and she was found to have choledocholithiasis on CT scan, enlarged common bile duct.  She was also found to have leukocytosis and hyperbilirubinemia.  She was started on antibiotics for possible cholangitis and transferred to [facility] for further care.    During her hospitalization at [facility], we performed an MRCP which was fortunately negative for choledocholithiasis.  At this time I assume she had passed her gallstones through the common bile duct upon her ambulance ride from [facility] to [facility].  She maintained with hyper bilirubinemia up to 10.8, ALT up to 419, AST up to 367.    Given the results of her CT finding and gallbladder wall thickening on MRCP, it was recommended to have laparoscopic cholecystectomy.  We had consulted Dr. Phelan upon admission. (Please refer to Dr. Phelan's notes).  He offered cholecystectomy to the patient on 2/12, Ms. Krishnan wanted to have time to think about surgery.  She was hoping she did not require surgery at this time and only an ERCP similar to what she did in [facility].  She ultimately opted for surgery which occurred on 2/13.  As per Dr. Phelan's operative note, it did show patient had acute inflammatory process of the gallbladder and evidence of  pericholecystic fluid, she had periportal edema and unfortunately biliary ascites.      Ms. Krishnan tolerated the procedure well, minimal soreness after surgery which is expected.  She had postop leukocytosis of 12,000, hemoglobin 9.7 remaining with her chronic anemia from thalassemia.  Her AST was 80, , total bilirubin 4.7.  She states this is the lowest bilirubin level she has had in a long time.    At this time she will follow-up with Dr. Phelan via telehealth as she is returning to [facility].  I recommended that she follow-up with her primary care physician as soon as possible to repeat labs including CMP and CBC.  Given her leukocytosis and small biliary ascites, she was given IV ceftriaxone 2g on the day of discharge and prescribed 5 days of p.o. Augmentin.  I also prescribed short course of oxycodone for pain control.  Her medications were delivered via meds to beds.    I counseled patient on any worsening symptoms when she returns to [place] including fevers, worsening abdominal pains, worsening nausea or vomiting.  She may need evaluation at hospital if the symptoms occur.    We did talk about her beta thalassemia, she will continue follow-up with her hematologist at [facility] in [facility].  She is aware her ferritin level is 1934.      Therefore, she is discharged in good and stable condition to home with close outpatient follow-up.    The patient met 2-midnight criteria for an inpatient stay at the time of discharge.    Discharge Date  02/14/2024    FOLLOW UP ITEMS POST DISCHARGE  With PCP, Dr. Phelan on televisit    DISCHARGE DIAGNOSES  Principal Problem (Resolved):    Choledocholithiasis (POA: Yes)  Active Problems:    Beta-thalassemia (HCC) (POA: Yes)    Hyperbilirubinemia (POA: Yes)    Fall (POA: Yes)  Resolved Problems:    Acute cholecystitis (POA: Yes)    Hypokalemia (POA: Yes)      FOLLOW UP  No future appointments.  primary care provider    Follow up    Marjna Renee follow up with labs including CBC and CMP.    Rayshawn Phelan M.D.  [address] [address] [phone]    Call  please call for your televisit      MEDICATIONS ON DISCHARGE     Medication List        START taking these medications        Instructions   amoxicillin-clavulanate 875-125 MG Tabs  Commonly known as: Augmentin   Take 1 Tablet by mouth 2 times a day for 5 days.  Dose: 1 Tablet     oxyCODONE immediate-release 5 MG Tabs  Commonly known as: Roxicodone   Take 1 Tablet by mouth every 8 hours as needed for Severe Pain for up to 3 days.  Dose: 5 mg            CONTINUE taking these medications        Instructions   ibuprofen 200 MG Tabs  Commonly known as: Motrin   Take 400-600 mg by mouth every 6 hours as needed. Indications: Pain  Dose: 400-600 mg              Allergies  No Known Allergies    DIET  Orders Placed This Encounter   Procedures    Diet Order Diet: Low Fiber(GI Soft)     Standing Status:   Standing     Number of Occurrences:   1     Order Specific Question:   Diet:     Answer:   Low Fiber(GI Soft) [2]       ACTIVITY  As tolerated.  Weight bearing as tolerated  Weight lifting limit to 5 pounds for the next 2 weeks    CONSULTATIONS  Dr. Phelan    PROCEDURES  Laparoscopic cholecystectomy on 2/13/2024    LABORATORY  Lab Results   Component Value Date    SODIUM 138 02/14/2024    POTASSIUM 4.3 02/14/2024    CHLORIDE 103 02/14/2024    CO2 27 02/14/2024    GLUCOSE 109 (H) 02/14/2024    BUN 5 (L) 02/14/2024    CREATININE 0.52 02/14/2024        Lab Results   Component Value Date    WBC 12.0 (H) 02/14/2024    HEMOGLOBIN 9.7 (L) 02/14/2024    HEMATOCRIT 32.3 (L) 02/14/2024    PLATELETCT 339 02/14/2024      [id number]   Final Result      1.  No significant biliary dilation.   2.  No common bile duct stone demonstrated.  Apparent interval passage of previously described stones.   3.  Gallbladder wall thickening suggesting cholecystitis, without gallstone present.   4.   Liver shows periportal edema which may be related to intra-abdominal inflammatory process or aggressive hydration.   5.  Mild splenomegaly.          Total time of the discharge process exceeds 35 minutes.

## 2024-02-14 NOTE — CARE PLAN
The patient is Stable - Low risk of patient condition declining or worsening    Shift Goals  Clinical Goals: Patient will report pain level 3/10 or less throughout the shift  Patient Goals: will get surgery today    Progress made toward(s) clinical / shift goals:  Patient reports mild pain to surgical site, medicated as per MAR.  Patient up to the bathroom and voided already    Patient is not progressing towards the following goals:    Problem: Knowledge Deficit - Standard  Goal: Patient and family/care givers will demonstrate understanding of plan of care, disease process/condition, diagnostic tests and medications  Outcome: Progressing     Problem: Pain - Standard  Goal: Alleviation of pain or a reduction in pain to the patient’s comfort goal  Outcome: Progressing     Problem: Hemodynamics  Goal: Patient's hemodynamics, fluid balance and neurologic status will be stable or improve  Outcome: Progressing     Problem: Neuro Status  Goal: Neuro status will remain stable or improve  Outcome: Progressing     Problem: Neurovascular Monitoring  Goal: Patient's neurovascular status will be maintained or improve  Outcome: Progressing     Problem: Respiratory/Oxygenation Function Post-Surgical  Goal: Patient will achieve/maintain normal respiratory rate/effort  Outcome: Progressing     Problem: Early Mobilization - Post Surgery  Goal: Early mobilization post surgery  Outcome: Progressing     Problem: Pain - Post Surgery  Goal: Alleviation or reduction of pain post surgery  Outcome: Progressing     Problem: Wound/ / Incision Healing  Goal: Patient's wound/surgical incision will decrease in size and heals properly  Outcome: Progressing     Problem: Bowel Elimination - Post Surgical  Goal: Patient will resume regular bowel sounds and function with no discomfort or distention  Outcome: Progressing     Problem: Respiratory  Goal: Patient will achieve/maintain optimum respiratory ventilation and gas exchange  Outcome:  Progressing

## 2024-02-14 NOTE — PROGRESS NOTES
Received bedside patient report from BOSTON Moreno. Patient is awake, alert & oriented x4. Patient on room air, respirations unlabored. IV fluids infusing. Lap sites to abdomen closed with dermabond, open to air. Patient resting in bed. Patient educated on call light use for needs. Plan of care discussed with patient. Belongings within reach. Bed at lowest position. Safety precautions in place.

## 2024-02-14 NOTE — OR NURSING
1620: Report received from BOSTON Arce. Patient arousable, respirations are spontaneous and unlabored. VSS, patient desat to 85% on room air and placed on 2L nasal cannula. 4 abdominal surgical sites are CDI. Cold pack in place.     1625: Report to patient's spouse.     1630: Patient states pain is tolerable, denies nausea.     1635: No changes. Meets criteria to transfer to floor. Report to Humberto MEAD.     1641: Transferred on O2 tank @ half full.

## (undated) DEVICE — ELECTRODE DUAL RETURN W/ CORD - (50/PK)

## (undated) DEVICE — FORCEPS PROGRASP (18UN/EA)

## (undated) DEVICE — SET LEADWIRE 5 LEAD BEDSIDE DISPOSABLE ECG (1SET OF 5/EA)

## (undated) DEVICE — SUTURE 4-0 MONOCRYL PLUS PS-2 - 27 INCH (36/BX)

## (undated) DEVICE — TOWEL STOP TIMEOUT SAFETY FLAG (40EA/CA)

## (undated) DEVICE — BAG SPONGE COUNT 10.25 X 32 - BLUE (250/CA)

## (undated) DEVICE — SEAL 5MM-8MM UNIVERSAL  BOX OF 10

## (undated) DEVICE — OBTURATOR BLADELESS STANDARD 8MM (6EA/BX)

## (undated) DEVICE — DRAPE COLUMN  BOX OF 20

## (undated) DEVICE — GLOVE BIOGEL SZ 8 SURGICAL PF LTX - (50PR/BX 4BX/CA)

## (undated) DEVICE — HUMID-VENT HEAT AND MOISTURE EXCHANGE- (50/BX)

## (undated) DEVICE — TUBE CONNECTING SUCTION - CLEAR PLASTIC STERILE 72 IN (50EA/CA)

## (undated) DEVICE — GOWN WARMING STANDARD FLEX - (30/CA)

## (undated) DEVICE — CHLORAPREP 26 ML APPLICATOR - ORANGE TINT(25/CA)

## (undated) DEVICE — SUTURE 0 VICRYL PLUS UR-6 - 27 INCH (36/BX)

## (undated) DEVICE — COVER MAYO STAND X-LG - (22EA/CA)

## (undated) DEVICE — NEEDLE INSFL 120MM 14GA VRRS - (20/BX)

## (undated) DEVICE — BAG RETRIEVAL 5MM (10EA/BX)

## (undated) DEVICE — IRRIGATOR SUCTION ENDOWRIST DISPOSABLE OD8 MM (6EA/BX)

## (undated) DEVICE — SLEEVE, VASO, THIGH, MED

## (undated) DEVICE — DRAPE IOBAN II INCISE 23X17 - (10EA/BX 4BX/CA)

## (undated) DEVICE — SYSTEM CLEARIFY VISUALIZATION (10EA/PK)

## (undated) DEVICE — CANISTER SUCTION RIGID RED 1500CC (40EA/CA)

## (undated) DEVICE — SENSOR OXIMETER ADULT SPO2 RD SET (20EA/BX)

## (undated) DEVICE — SODIUM CHL IRRIGATION 0.9% 1000ML (12EA/CA)

## (undated) DEVICE — DRAPE ARM  BOX OF 20

## (undated) DEVICE — Device

## (undated) DEVICE — WATER IRRIGATION STERILE 1000ML (12EA/CA)

## (undated) DEVICE — SUTURE GENERAL

## (undated) DEVICE — SUCTION INSTRUMENT YANKAUER BULBOUS TIP W/O VENT (50EA/CA)

## (undated) DEVICE — CLIP HEMOLOCK PURPLE - (14/BX)

## (undated) DEVICE — CLIP APPLIER LARGE DA VINCI 100X'S REUSABLE

## (undated) DEVICE — HOOK PERMANENT CAUTERY DA VINCI 10X'S REUSABLE